# Patient Record
Sex: MALE | Employment: UNEMPLOYED | URBAN - METROPOLITAN AREA
[De-identification: names, ages, dates, MRNs, and addresses within clinical notes are randomized per-mention and may not be internally consistent; named-entity substitution may affect disease eponyms.]

---

## 2017-03-25 ENCOUNTER — APPOINTMENT (OUTPATIENT)
Dept: GENERAL RADIOLOGY | Age: 42
DRG: 603 | End: 2017-03-25
Attending: NURSE PRACTITIONER
Payer: COMMERCIAL

## 2017-03-25 ENCOUNTER — HOSPITAL ENCOUNTER (INPATIENT)
Age: 42
LOS: 3 days | Discharge: HOME | DRG: 603 | End: 2017-03-29
Attending: EMERGENCY MEDICINE | Admitting: HOSPITALIST
Payer: COMMERCIAL

## 2017-03-25 DIAGNOSIS — L03.119 CELLULITIS OF WRIST: ICD-10-CM

## 2017-03-25 DIAGNOSIS — L03.114 CELLULITIS OF LEFT ELBOW: Primary | ICD-10-CM

## 2017-03-25 DIAGNOSIS — R79.89 ELEVATED LACTIC ACID LEVEL: ICD-10-CM

## 2017-03-25 DIAGNOSIS — L03.114 CELLULITIS OF LEFT FOREARM: ICD-10-CM

## 2017-03-25 LAB
ANION GAP BLD CALC-SCNC: 8 MMOL/L (ref 3–18)
BASOPHILS # BLD AUTO: 0 K/UL (ref 0–0.06)
BASOPHILS # BLD: 0 % (ref 0–2)
BUN SERPL-MCNC: 11 MG/DL (ref 7–18)
BUN/CREAT SERPL: 13 (ref 12–20)
CALCIUM SERPL-MCNC: 8.8 MG/DL (ref 8.5–10.1)
CHLORIDE SERPL-SCNC: 104 MMOL/L (ref 100–108)
CO2 SERPL-SCNC: 27 MMOL/L (ref 21–32)
CREAT SERPL-MCNC: 0.82 MG/DL (ref 0.6–1.3)
DIFFERENTIAL METHOD BLD: ABNORMAL
EOSINOPHIL # BLD: 0.2 K/UL (ref 0–0.4)
EOSINOPHIL NFR BLD: 2 % (ref 0–5)
ERYTHROCYTE [DISTWIDTH] IN BLOOD BY AUTOMATED COUNT: 14.8 % (ref 11.6–14.5)
GLUCOSE SERPL-MCNC: 112 MG/DL (ref 74–99)
HCT VFR BLD AUTO: 43 % (ref 36–48)
HGB BLD-MCNC: 13.6 G/DL (ref 13–16)
LACTATE BLD-SCNC: 2.4 MMOL/L (ref 0.4–2)
LYMPHOCYTES # BLD AUTO: 12 % (ref 21–52)
LYMPHOCYTES # BLD: 1.3 K/UL (ref 0.9–3.6)
MCH RBC QN AUTO: 28.4 PG (ref 24–34)
MCHC RBC AUTO-ENTMCNC: 31.6 G/DL (ref 31–37)
MCV RBC AUTO: 89.8 FL (ref 74–97)
MONOCYTES # BLD: 1 K/UL (ref 0.05–1.2)
MONOCYTES NFR BLD AUTO: 9 % (ref 3–10)
NEUTS SEG # BLD: 9 K/UL (ref 1.8–8)
NEUTS SEG NFR BLD AUTO: 77 % (ref 40–73)
PLATELET # BLD AUTO: 249 K/UL (ref 135–420)
PMV BLD AUTO: 11.4 FL (ref 9.2–11.8)
POTASSIUM SERPL-SCNC: 3.8 MMOL/L (ref 3.5–5.5)
RBC # BLD AUTO: 4.79 M/UL (ref 4.7–5.5)
SODIUM SERPL-SCNC: 139 MMOL/L (ref 136–145)
WBC # BLD AUTO: 11.5 K/UL (ref 4.6–13.2)

## 2017-03-25 PROCEDURE — 83605 ASSAY OF LACTIC ACID: CPT

## 2017-03-25 PROCEDURE — 73080 X-RAY EXAM OF ELBOW: CPT

## 2017-03-25 PROCEDURE — 96375 TX/PRO/DX INJ NEW DRUG ADDON: CPT

## 2017-03-25 PROCEDURE — 99284 EMERGENCY DEPT VISIT MOD MDM: CPT

## 2017-03-25 PROCEDURE — 74011250636 HC RX REV CODE- 250/636: Performed by: NURSE PRACTITIONER

## 2017-03-25 PROCEDURE — 74011000258 HC RX REV CODE- 258: Performed by: NURSE PRACTITIONER

## 2017-03-25 PROCEDURE — 96365 THER/PROPH/DIAG IV INF INIT: CPT

## 2017-03-25 PROCEDURE — 80048 BASIC METABOLIC PNL TOTAL CA: CPT | Performed by: NURSE PRACTITIONER

## 2017-03-25 PROCEDURE — 73090 X-RAY EXAM OF FOREARM: CPT

## 2017-03-25 PROCEDURE — 87040 BLOOD CULTURE FOR BACTERIA: CPT | Performed by: NURSE PRACTITIONER

## 2017-03-25 PROCEDURE — 85025 COMPLETE CBC W/AUTO DIFF WBC: CPT | Performed by: NURSE PRACTITIONER

## 2017-03-25 PROCEDURE — 73130 X-RAY EXAM OF HAND: CPT

## 2017-03-25 PROCEDURE — 96366 THER/PROPH/DIAG IV INF ADDON: CPT

## 2017-03-25 RX ORDER — HYDRALAZINE HYDROCHLORIDE 20 MG/ML
10 INJECTION INTRAMUSCULAR; INTRAVENOUS ONCE
Status: COMPLETED | OUTPATIENT
Start: 2017-03-25 | End: 2017-03-25

## 2017-03-25 RX ORDER — MORPHINE SULFATE 2 MG/ML
6 INJECTION, SOLUTION INTRAMUSCULAR; INTRAVENOUS
Status: COMPLETED | OUTPATIENT
Start: 2017-03-25 | End: 2017-03-25

## 2017-03-25 RX ADMIN — MORPHINE SULFATE 6 MG: 2 INJECTION, SOLUTION INTRAMUSCULAR; INTRAVENOUS at 22:58

## 2017-03-25 RX ADMIN — HYDRALAZINE HYDROCHLORIDE 10 MG: 20 INJECTION INTRAMUSCULAR; INTRAVENOUS at 22:57

## 2017-03-25 RX ADMIN — PIPERACILLIN SODIUM,TAZOBACTAM SODIUM 3.38 G: 3; .375 INJECTION, POWDER, FOR SOLUTION INTRAVENOUS at 23:08

## 2017-03-25 RX ADMIN — SODIUM CHLORIDE 1000 ML: 900 INJECTION, SOLUTION INTRAVENOUS at 23:08

## 2017-03-25 NOTE — IP AVS SNAPSHOT
Current Discharge Medication List  
  
START taking these medications Dose & Instructions Dispensing Information Comments Morning Noon Evening Bedtime  
 hydrOXYzine pamoate 25 mg capsule Commonly known as:  VISTARIL Your last dose was: Your next dose is:    
   
   
 Dose:  25 mg Take 1 Cap by mouth three (3) times daily as needed for Itching for up to 14 days. Quantity:  20 Cap Refills:  0  
     
   
   
   
  
 trimethoprim-sulfamethoxazole 160-800 mg per tablet Commonly known as:  BACTRIM DS Your last dose was: Your next dose is:    
   
   
 Dose:  1 Tab Take 1 Tab by mouth two (2) times a day. Indications: Skin and Skin Structure Infection Quantity:  18 Tab Refills:  0 CONTINUE these medications which have CHANGED Dose & Instructions Dispensing Information Comments Morning Noon Evening Bedtime  
 triamcinolone acetonide 0.1 % ointment Commonly known as:  KENALOG What changed:  Another medication with the same name was removed. Continue taking this medication, and follow the directions you see here. Your last dose was: Your next dose is:    
   
   
 Apply  to affected area two (2) times a day. use thin layer Refills:  0 CONTINUE these medications which have NOT CHANGED Dose & Instructions Dispensing Information Comments Morning Noon Evening Bedtime  
 atorvastatin 40 mg tablet Commonly known as:  LIPITOR Your last dose was: Your next dose is:    
   
   
 Dose:  40 mg Take 40 mg by mouth daily. Refills:  0  
     
   
   
   
  
 famotidine 20 mg tablet Commonly known as:  PEPCID Your last dose was: Your next dose is:    
   
   
 Dose:  20 mg Take 1 Tab by mouth two (2) times a day. Quantity:  30 Tab Refills:  0  
     
   
   
   
  
 hydroCHLOROthiazide 12.5 mg tablet Commonly known as:  HYDRODIURIL  
   
 Your last dose was: Your next dose is:    
   
   
 Dose:  12.5 mg Take 12.5 mg by mouth daily. Refills:  0  
     
   
   
   
  
 oxyCODONE-acetaminophen 5-325 mg per tablet Commonly known as:  PERCOCET Your last dose was: Your next dose is:    
   
   
 Dose:  1 Tab Take 1 tablet by mouth every eight (8) hours as needed. Quantity:  10 tablet Refills:  0  
     
   
   
   
  
 senna-docusate 8.6-50 mg per tablet Commonly known as:  Jeral Bevel Your last dose was: Your next dose is:    
   
   
 Dose:  2 Tab Take 2 tablets by mouth nightly. Quantity:  30 tablet Refills:  1 Where to Get Your Medications Information on where to get these meds will be given to you by the nurse or doctor. ! Ask your nurse or doctor about these medications  
  hydrOXYzine pamoate 25 mg capsule  
 trimethoprim-sulfamethoxazole 160-800 mg per tablet

## 2017-03-25 NOTE — IP AVS SNAPSHOT
[facility] 
 
 
 [address] [place] [address] 
[phone] Patient: Lani Lara MRN: [id number] FBP:2/93/5030 You are allergic to the following Allergen Reactions Chocolate (Cocoa) Hives Citrus And Derivatives Hives Dairy Aid (Lactase) Hives Egg Hives [address] Iodine And Iodide Containing Products Hives Mercury (Elemental) Unknown (comments) Nuts (Tree Nut) Hives Shellfish Containing Products Hives Tomato Hives Recent Documentation Height Weight BMI Smoking Status 1.88 m 86.2 kg 24.39 kg/m2 Never Smoker Unresulted Labs Order Current Status CULTURE, BLOOD Preliminary result CULTURE, BLOOD Preliminary result Emergency Contacts Name Discharge Info Relation Home Work Mobile Ulysses Alatorre  Friend [5] [phone] About your hospitalization You were admitted on:  March 26, 2017 You last received care in the:  [address] [facility] You were discharged on:  March 29, 2017 Unit phone number:  [phone] Why you were hospitalized Your primary diagnosis was:  Not on File Your diagnoses also included:  Cellulitis Of Left Forearm Providers Seen During Your Hospitalizations Provider Role Specialty Primary office phone Santhosh Washburn DO Attending Provider Emergency Medicine [phone] Demetrice Grey DO Attending Provider Internal Medicine [phone] Laurel Kemp MD [facility] [phone] Taylor Camara MD Attending Provider Internal Medicine [phone] Your Primary Care Physician (PCP) Primary Care Physician Office Phone Office Fax Arleen Pillai [phone] Follow-up Information Follow up With Details Comments Contact Info Irlanda Owens MD   [address] [facility] [phone] [facility] [id number] [phone] Follow up with Dermatologist in 1-2 weeks Current Discharge Medication List  
  
START taking these medications Dose & Instructions Dispensing Information Comments Morning Noon Evening Bedtime  
 hydrOXYzine pamoate 25 mg capsule Commonly known as:  VISTARIL Your last dose was: Your next dose is:    
   
   
 Dose:  25 mg Take 1 Cap by mouth three (3) times daily as needed for Itching for up to 14 days. Quantity:  20 Cap Refills:  0  
     
   
   
   
  
 trimethoprim-sulfamethoxazole 160-800 mg per tablet Commonly known as:  BACTRIM DS Your last dose was: Your next dose is:    
   
   
 Dose:  1 Tab Take 1 Tab by mouth two (2) times a day. Indications: Skin and Skin Structure Infection Quantity:  18 Tab Refills:  0 CONTINUE these medications which have CHANGED Dose & Instructions Dispensing Information Comments Morning Noon Evening Bedtime  
 triamcinolone acetonide 0.1 % ointment Commonly known as:  KENALOG What changed:  Another medication with the same name was removed. Continue taking this medication, and follow the directions you see here. Your last dose was: Your next dose is:    
   
   
 Apply  to affected area two (2) times a day. use thin layer Refills:  0 CONTINUE these medications which have NOT CHANGED Dose & Instructions Dispensing Information Comments Morning Noon Evening Bedtime  
 atorvastatin 40 mg tablet Commonly known as:  LIPITOR Your last dose was: Your next dose is:    
   
   
 Dose:  40 mg Take 40 mg by mouth daily. Refills:  0  
     
   
   
   
  
 famotidine 20 mg tablet Commonly known as:  PEPCID Your last dose was: Your next dose is:    
   
   
 Dose:  20 mg Take 1 Tab by mouth two (2) times a day. Quantity:  30 Tab Refills:  0  
     
   
   
   
  
 hydroCHLOROthiazide 12.5 mg tablet Commonly known as:  HYDRODIURIL Your last dose was: Your next dose is:    
   
   
 Dose:  12.5 mg Take 12.5 mg by mouth daily. Refills:  0  
     
   
   
   
  
 oxyCODONE-acetaminophen 5-325 mg per tablet Commonly known as:  PERCOCET Your last dose was: Your next dose is:    
   
   
 Dose:  1 Tab Take 1 tablet by mouth every eight (8) hours as needed. Quantity:  10 tablet Refills:  0  
     
   
   
   
  
 senna-docusate 8.6-50 mg per tablet Commonly known as:  Megha Riches Your last dose was: Your next dose is:    
   
   
 Dose:  2 Tab Take 2 tablets by mouth nightly. Quantity:  30 tablet Refills:  1 Where to Get Your Medications Information on where to get these meds will be given to you by the nurse or doctor. ! Ask your nurse or doctor about these medications  
  hydrOXYzine pamoate 25 mg capsule  
 trimethoprim-sulfamethoxazole 160-800 mg per tablet Discharge Instructions DISCHARGE SUMMARY from Nurse The following personal items are in your possession at time of discharge: 
 
Dental Appliances: None Visual Aid: None, Glasses Home Medications: None Jewelry: None Clothing: Belt, Footwear, Jacket/Coat, Pants, Shirt, Socks, Undergarments Other Valuables: Cell Phone, Orvis Spindle PATIENT INSTRUCTIONS: 
 
 
F-face looks uneven A-arms unable to move or move unevenly S-speech slurred or non-existent T-time-call 911 as soon as signs and symptoms begin-DO NOT go Back to bed or wait to see if you get better-TIME IS BRAIN. Warning Signs of HEART ATTACK Call 911 if you have these symptoms: 
? Chest discomfort. Most heart attacks involve discomfort in the center of the chest that lasts more than a few minutes, or that goes away and comes back. It can feel like uncomfortable pressure, squeezing, fullness, or pain. ? Discomfort in other areas of the upper body. Symptoms can include pain or discomfort in one or both arms, the back, neck, jaw, or stomach. ? Shortness of breath with or without chest discomfort. ? Other signs may include breaking out in a cold sweat, nausea, or lightheadedness. Don't wait more than five minutes to call [address]! Fast action can save your life. Calling 911 is almost always the fastest way to get lifesaving treatment. Emergency Medical Services staff can begin treatment when they arrive  up to an hour sooner than if someone gets to the hospital by car. The discharge information has been reviewed with the patient. The patient verbalized understanding. Discharge medications reviewed with the patient and appropriate educational materials and side effects teaching were provided. Cellulitis: Care Instructions Your Care Instructions Cellulitis is a skin infection. It often occurs after a break in the skin from a scrape, cut, bite, or puncture, or after a rash. The doctor has checked you carefully, but problems can develop later. If you notice any problems or new symptoms, get medical treatment right away. Follow-up care is a key part of your treatment and safety. Be sure to make and go to all appointments, and call your doctor if you are having problems. It's also a good idea to know your test results and keep a list of the medicines you take. How can you care for yourself at home? · Take your antibiotics as directed. Do not stop taking them just because you feel better. You need to take the full course of antibiotics. · Prop up the infected area on pillows to reduce pain and swelling. Try to keep the area above the level of your heart as often as you can. · If your doctor told you how to care for your wound, follow your doctor's instructions. If you did not get instructions, follow this general advice: ¨ Wash the wound with clean water 2 times a day. Don't use hydrogen peroxide or alcohol, which can slow healing. ¨ You may cover the wound with a thin layer of petroleum jelly, such as Vaseline, and a nonstick bandage. ¨ Apply more petroleum jelly and replace the bandage as needed. · Be safe with medicines. Take pain medicines exactly as directed. ¨ If the doctor gave you a prescription medicine for pain, take it as prescribed. ¨ If you are not taking a prescription pain medicine, ask your doctor if you can take an over-the-counter medicine. To prevent cellulitis in the future · Try to prevent cuts, scrapes, or other injuries to your skin. Cellulitis most often occurs where there is a break in the skin. · If you get a scrape, cut, mild burn, or bite, wash the wound with clean water as soon as you can to help avoid infection. Don't use hydrogen peroxide or alcohol, which can slow healing. · If you have swelling in your legs (edema), support stockings and good skin care may help prevent leg sores and cellulitis. · Take care of your feet, especially if you have diabetes or other conditions that increase the risk of infection. Wear shoes and socks. Do not go barefoot. If you have athlete's foot or other skin problems on your feet, talk to your doctor about how to treat them. When should you call for help? Call your doctor now or seek immediate medical care if: 
· You have signs that your infection is getting worse, such as: 
¨ Increased pain, swelling, warmth, or redness. ¨ Red streaks leading from the area. ¨ Pus draining from the area. ¨ A fever. · You get a rash. Watch closely for changes in your health, and be sure to contact your doctor if: 
· You are not getting better after 1 day (24 hours). · You do not get better as expected. Where can you learn more? Go to http://myrna-omari.info/. Richard Wright in the search box to learn more about \"Cellulitis: Care Instructions. \" Current as of: October 13, 2016 Content Version: 11.2 © 0462-1689 [employer]. Care instructions adapted under license by [employer] (which disclaims liability or warranty for this information). If you have questions about a medical condition or this instruction, always ask your healthcare professional. [address] any warranty or liability for your use of this information. High Blood Pressure: Care Instructions Your Care Instructions If your blood pressure is usually above 140/90, you have high blood pressure, or hypertension. That means the top number is 140 or higher or the bottom number is 90 or higher, or both. Despite what a lot of people think, high blood pressure usually doesn't cause headaches or make you feel dizzy or lightheaded. It usually has no symptoms. But it does increase your risk for heart attack, stroke, and kidney or eye damage. The higher your blood pressure, the more your risk increases. Your doctor will give you a goal for your blood pressure. Your goal will be based on your health and your age. An example of a goal is to keep your blood pressure below 140/90. Lifestyle changes, such as eating healthy and being active, are always important to help lower blood pressure. You might also take medicine to reach your blood pressure goal. 
Follow-up care is a key part of your treatment and safety.  Be sure to make and go to all appointments, and call your doctor if you are having problems. It's also a good idea to know your test results and keep a list of the medicines you take. How can you care for yourself at home? Medical treatment · If you stop taking your medicine, your blood pressure will go back up. You may take one or more types of medicine to lower your blood pressure. Be safe with medicines. Take your medicine exactly as prescribed. Call your doctor if you think you are having a problem with your medicine. · Talk to your doctor before you start taking aspirin every day. Aspirin can help certain people lower their risk of a heart attack or stroke. But taking aspirin isn't right for everyone, because it can cause serious bleeding. · See your doctor regularly. You may need to see the doctor more often at first or until your blood pressure comes down. · If you are taking blood pressure medicine, talk to your doctor before you take decongestants or anti-inflammatory medicine, such as ibuprofen. Some of these medicines can raise blood pressure. · Learn how to check your blood pressure at home. Lifestyle changes · Stay at a healthy weight. This is especially important if you put on weight around the waist. Losing even 10 pounds can help you lower your blood pressure. · If your doctor recommends it, get more exercise. Walking is a good choice. Bit by bit, increase the amount you walk every day. Try for at least 30 minutes on most days of the week. You also may want to swim, bike, or do other activities. · Avoid or limit alcohol. Talk to your doctor about whether you can drink any alcohol. · Try to limit how much sodium you eat to less than 2,300 milligrams (mg) a day. Your doctor may ask you to try to eat less than 1,500 mg a day. · Eat plenty of fruits (such as bananas and oranges), vegetables, legumes, whole grains, and low-fat dairy products. · Lower the amount of saturated fat in your diet.  Saturated fat is found in animal products such as milk, cheese, and meat. Limiting these foods may help you lose weight and also lower your risk for heart disease. · Do not smoke. Smoking increases your risk for heart attack and stroke. If you need help quitting, talk to your doctor about stop-smoking programs and medicines. These can increase your chances of quitting for good. When should you call for help? Call 911 anytime you think you may need emergency care. This may mean having symptoms that suggest that your blood pressure is causing a serious heart or blood vessel problem. Your blood pressure may be over 180/110. For example, call 911 if: 
· You have symptoms of a heart attack. These may include: ¨ Chest pain or pressure, or a strange feeling in the chest. 
¨ Sweating. ¨ Shortness of breath. ¨ Nausea or vomiting. ¨ Pain, pressure, or a strange feeling in the back, neck, jaw, or upper belly or in one or both shoulders or arms. ¨ Lightheadedness or sudden weakness. ¨ A fast or irregular heartbeat. · You have symptoms of a stroke. These may include: 
¨ Sudden numbness, tingling, weakness, or loss of movement in your face, arm, or leg, especially on only one side of your body. ¨ Sudden vision changes. ¨ Sudden trouble speaking. ¨ Sudden confusion or trouble understanding simple statements. ¨ Sudden problems with walking or balance. ¨ A sudden, severe headache that is different from past headaches. · You have severe back or belly pain. Do not wait until your blood pressure comes down on its own. Get help right away. Call your doctor now or seek immediate care if: 
· Your blood pressure is much higher than normal (such as 180/110 or higher), but you don't have symptoms. · You think high blood pressure is causing symptoms, such as: ¨ Severe headache. ¨ Blurry vision. Watch closely for changes in your health, and be sure to contact your doctor if: · Your blood pressure measures 140/90 or higher at least 2 times. That means the top number is 140 or higher or the bottom number is 90 or higher, or both. · You think you may be having side effects from your blood pressure medicine. · Your blood pressure is usually normal, but it goes above normal at least 2 times. Where can you learn more? Go to http://myrna-omari.info/. Enter X834 in the search box to learn more about \"High Blood Pressure: Care Instructions. \" Current as of: August 8, 2016 Content Version: 11.2 © 1021-6383 [employer]. Care instructions adapted under license by [employer] (which disclaims liability or warranty for this information). If you have questions about a medical condition or this instruction, always ask your healthcare professional. [address] any warranty or liability for your use of this information. Low Sodium Diet (2,000 Milligram): Care Instructions Your Care Instructions Too much sodium causes your body to hold on to extra water. This can raise your blood pressure and force your heart and kidneys to work harder. In very serious cases, this could cause you to be put in the hospital. It might even be life-threatening. By limiting sodium, you will feel better and lower your risk of serious problems. The most common source of sodium is salt. People get most of the salt in their diet from canned, prepared, and packaged foods. Fast food and restaurant meals also are very high in sodium. Your doctor will probably limit your sodium to less than 2,000 milligrams (mg) a day. This limit counts all the sodium in prepared and packaged foods and any salt you add to your food. Follow-up care is a key part of your treatment and safety. Be sure to make and go to all appointments, and call your doctor if you are having problems.  It's also a good idea to know your test results and keep a list of the medicines you take. How can you care for yourself at home? Read food labels · Read labels on cans and food packages. The labels tell you how much sodium is in each serving. Make sure that you look at the serving size. If you eat more than the serving size, you have eaten more sodium. · Food labels also tell you the Percent Daily Value for sodium. Choose products with low Percent Daily Values for sodium. · Be aware that sodium can come in forms other than salt, including monosodium glutamate (MSG), sodium citrate, and sodium bicarbonate (baking soda). MSG is often added to Asian food. When you eat out, you can sometimes ask for food without MSG or added salt. Buy low-sodium foods · Buy foods that are labeled \"unsalted\" (no salt added), \"sodium-free\" (less than 5 mg of sodium per serving), or \"low-sodium\" (less than 140 mg of sodium per serving). Foods labeled \"reduced-sodium\" and \"light sodium\" may still have too much sodium. Be sure to read the label to see how much sodium you are getting. · Buy fresh vegetables, or frozen vegetables without added sauces. Buy low-sodium versions of canned vegetables, soups, and other canned goods. Prepare low-sodium meals · Cut back on the amount of salt you use in cooking. This will help you adjust to the taste. Do not add salt after cooking. One teaspoon of salt has about 2,300 mg of sodium. · Take the salt shaker off the table. · Flavor your food with garlic, lemon juice, onion, vinegar, herbs, and spices. Do not use soy sauce, lite soy sauce, steak sauce, onion salt, garlic salt, celery salt, mustard, or ketchup on your food. · Use low-sodium salad dressings, sauces, and ketchup. Or make your own salad dressings and sauces without adding salt. · Use less salt (or none) when recipes call for it. You can often use half the salt a recipe calls for without losing flavor. Other foods such as rice, pasta, and grains do not need added salt. · Rinse canned vegetables, and cook them in fresh water. This removes somebut not allof the salt. · Avoid water that is naturally high in sodium or that has been treated with water softeners, which add sodium. Call your local water company to find out the sodium content of your water supply. If you buy bottled water, read the label and choose a sodium-free brand. Avoid high-sodium foods · Avoid eating: ¨ Smoked, cured, salted, and canned meat, fish, and poultry. ¨ Ham, gomez, hot dogs, and luncheon meats. ¨ Regular, hard, and processed cheese and regular peanut butter. ¨ Crackers with salted tops, and other salted snack foods such as pretzels, chips, and salted popcorn. ¨ Frozen prepared meals, unless labeled low-sodium. ¨ Canned and dried soups, broths, and bouillon, unless labeled sodium-free or low-sodium. ¨ Canned vegetables, unless labeled sodium-free or low-sodium. ¨ Western Sandra fries, pizza, tacos, and other fast foods. ¨ Pickles, olives, ketchup, and other condiments, especially soy sauce, unless labeled sodium-free or low-sodium. Where can you learn more? Go to http://myrna-omari.info/. Enter T450 in the search box to learn more about \"Low Sodium Diet (2,000 Milligram): Care Instructions. \" Current as of: July 26, 2016 Content Version: 11.2 © 6991-6745 [employer]. Care instructions adapted under license by [employer] (which disclaims liability or warranty for this information). If you have questions about a medical condition or this instruction, always ask your healthcare professional. [place] [address] any warranty or liability for your use of this information. Learning About High Blood Pressure What is high blood pressure? Blood pressure is a measure of how hard the blood pushes against the walls of your arteries.  It's normal for blood pressure to go up and down throughout the day, but if it stays up, you have high blood pressure. Another name for high blood pressure is hypertension. Two numbers tell you your blood pressure. The first number is the systolic pressure. It shows how hard the blood pushes when your heart is pumping. The second number is the diastolic pressure. It shows how hard the blood pushes between heartbeats, when your heart is relaxed and filling with blood. A blood pressure of less than 120/80 (say \"120 over 80\") is ideal for an adult. High blood pressure is 140/90 or higher. You have high blood pressure if your top number is 140 or higher or your bottom number is 90 or higher, or both. Many people fall into the category in between, called prehypertension. People with prehypertension need to make lifestyle changes to bring their blood pressure down and help prevent or delay high blood pressure. What happens when you have high blood pressure? · Blood flows through your arteries with too much force. Over time, this damages the walls of your arteries. But you can't feel it. High blood pressure usually doesn't cause symptoms. · Fat and calcium start to build up in your arteries. This buildup is called plaque. Plaque makes your arteries narrower and stiffer. Blood can't flow through them as easily. · This lack of good blood flow starts to damage some of the organs in your body. This can lead to problems such as coronary artery disease and heart attack, heart failure, stroke, kidney failure, and eye damage. How can you prevent high blood pressure? · Stay at a healthy weight. · Try to limit how much sodium you eat to less than 2,300 milligrams (mg) a day. If you limit your sodium to 1,500 mg a day, you can lower your blood pressure even more. ¨ Buy foods that are labeled \"unsalted,\" \"sodium-free,\" or \"low-sodium. \" Foods labeled \"reduced-sodium\" and \"light sodium\" may still have too much sodium. ¨ Flavor your food with garlic, lemon juice, onion, vinegar, herbs, and spices instead of salt. Do not use soy sauce, steak sauce, onion salt, garlic salt, mustard, or ketchup on your food. ¨ Use less salt (or none) when recipes call for it. You can often use half the salt a recipe calls for without losing flavor. · Be physically active. Get at least 30 minutes of exercise on most days of the week. Walking is a good choice. You also may want to do other activities, such as running, swimming, cycling, or playing tennis or team sports. · Limit alcohol to 2 drinks a day for men and 1 drink a day for women. · Eat plenty of fruits, vegetables, and low-fat dairy products. Eat less saturated and total fats. How is high blood pressure treated? · Your doctor will suggest making lifestyle changes. For example, your doctor may ask you to eat healthy foods, quit smoking, lose extra weight, and be more active. · If lifestyle changes don't help enough or your blood pressure is very high, you will have to take medicine every day. Follow-up care is a key part of your treatment and safety. Be sure to make and go to all appointments, and call your doctor if you are having problems. It's also a good idea to know your test results and keep a list of the medicines you take. Where can you learn more? Go to http://myrna-omari.info/. Enter P501 in the search box to learn more about \"Learning About High Blood Pressure. \" Current as of: March 23, 2016 Content Version: 11.2 © 0015-4832 [employer]. Care instructions adapted under license by [employer] (which disclaims liability or warranty for this information). If you have questions about a medical condition or this instruction, always ask your healthcare professional. [place] [address] any warranty or liability for your use of this information. [employer] Thank you for requesting access to [employer]. Please follow the instructions below to securely access and download your online medical record. [employer] allows you to send messages to your doctor, view your test results, renew your prescriptions, schedule appointments, and more. How Do I Sign Up? 1. In your internet browser, go to [email or web address] 
2. Click on the First Time User? Click Here link in the Sign In box. You will be redirect to the New Member Sign Up page. 3. Enter your [employer] Access Code exactly as it appears below. You will not need to use this code after youve completed the sign-up process. If you do not sign up before the expiration date, you must request a new code. [employer] Access Code: [email or web address] Expires: 2017  9:04 PM (This is the date your [employer] access code will ) 4. Enter the last four digits of your Social Security Number (xxxx) and Date of Birth (mm/dd/yyyy) as indicated and click Submit. You will be taken to the next sign-up page. 5. Create a [employer] ID. This will be your [employer] login ID and cannot be changed, so think of one that is secure and easy to remember. 6. Create a [employer] password. You can change your password at any time. 7. Enter your Password Reset Question and Answer. This can be used at a later time if you forget your password. 8. Enter your e-mail address. You will receive e-mail notification when new information is available in [address] 9. Click Sign Up. You can now view and download portions of your medical record. 10. Click the Download Summary menu link to download a portable copy of your medical information. Additional Information If you have questions, please visit the Frequently Asked Questions section of the [employer] website at [email or web address] Remember, [employer] is NOT to be used for urgent needs. For medical emergencies, dial 911. Patient armband removed and shredded. Discharge Orders None Introducing [facility] SERVICES! Jose Jj introduces JUNIQE patient portal. Now you can access parts of your medical record, email your doctor's office, and request medication refills online. 1. In your internet browser, go to [email or web address] 2. Click on the First Time User? Click Here link in the Sign In box. You will see the New Member Sign Up page. 3. Enter your J[employer] Access Code exactly as it appears below. You will not need to use this code after youve completed the sign-up process. If you do not sign up before the expiration date, you must request a new code. · JUNIQE Access Code: [email or web address] Expires: 6/23/2017  9:04 PM 
 
4. Enter the last four digits of your Social Security Number (xxxx) and Date of Birth (mm/dd/yyyy) as indicated and click Submit. You will be taken to the next sign-up page. 5. Create a JUNIQE ID. This will be your JUN[employer]E login ID and cannot be changed, so think of one that is secure and easy to remember. 6. Create a JUNIQE password. You can change your password at any time. 7. Enter your Password Reset Question and Answer. This can be used at a later time if you forget your password. 8. Enter your e-mail address. You will receive e-mail notification when new information is available in [address] 9. Click Sign Up. You can now view and download portions of your medical record. 10. Click the Download Summary menu link to download a portable copy of your medical information. If you have questions, please visit the Frequently Asked Questions section of the JUNIQE website. Remember, JUNIQE is NOT to be used for urgent needs. For medical emergencies, dial 911. Now available from your iPhone and Android! General Information Please provide this summary of care documentation to your next provider.  
  
  
    
    
 Patient Signature: ____________________________________________________________ Date:  ____________________________________________________________  
  
Courtney Canes Provider Signature:  ____________________________________________________________ Date:  ____________________________________________________________

## 2017-03-26 PROBLEM — L03.114 CELLULITIS OF LEFT FOREARM: Status: ACTIVE | Noted: 2017-03-26

## 2017-03-26 LAB — LACTATE BLD-SCNC: 0.8 MMOL/L (ref 0.4–2)

## 2017-03-26 PROCEDURE — 83605 ASSAY OF LACTIC ACID: CPT

## 2017-03-26 PROCEDURE — 87186 SC STD MICRODIL/AGAR DIL: CPT | Performed by: HOSPITALIST

## 2017-03-26 PROCEDURE — 74011250637 HC RX REV CODE- 250/637: Performed by: HOSPITALIST

## 2017-03-26 PROCEDURE — 77030020263 HC SOL INJ SOD CL0.9% LFCR 1000ML

## 2017-03-26 PROCEDURE — 87070 CULTURE OTHR SPECIMN AEROBIC: CPT | Performed by: HOSPITALIST

## 2017-03-26 PROCEDURE — 65270000029 HC RM PRIVATE

## 2017-03-26 PROCEDURE — 87077 CULTURE AEROBIC IDENTIFY: CPT | Performed by: HOSPITALIST

## 2017-03-26 PROCEDURE — 74011000258 HC RX REV CODE- 258: Performed by: HOSPITALIST

## 2017-03-26 PROCEDURE — 74011250636 HC RX REV CODE- 250/636: Performed by: HOSPITALIST

## 2017-03-26 PROCEDURE — 93971 EXTREMITY STUDY: CPT

## 2017-03-26 RX ORDER — SODIUM CHLORIDE 0.9 % (FLUSH) 0.9 %
5-10 SYRINGE (ML) INJECTION EVERY 8 HOURS
Status: DISCONTINUED | OUTPATIENT
Start: 2017-03-26 | End: 2017-03-29 | Stop reason: HOSPADM

## 2017-03-26 RX ORDER — SODIUM CHLORIDE 0.9 % (FLUSH) 0.9 %
5-10 SYRINGE (ML) INJECTION AS NEEDED
Status: DISCONTINUED | OUTPATIENT
Start: 2017-03-26 | End: 2017-03-29 | Stop reason: HOSPADM

## 2017-03-26 RX ORDER — TRIAMCINOLONE ACETONIDE 1 MG/G
OINTMENT TOPICAL 2 TIMES DAILY
Status: DISCONTINUED | OUTPATIENT
Start: 2017-03-26 | End: 2017-03-29 | Stop reason: HOSPADM

## 2017-03-26 RX ORDER — SODIUM CHLORIDE 9 MG/ML
75 INJECTION, SOLUTION INTRAVENOUS CONTINUOUS
Status: DISCONTINUED | OUTPATIENT
Start: 2017-03-26 | End: 2017-03-29 | Stop reason: HOSPADM

## 2017-03-26 RX ORDER — HYDROCHLOROTHIAZIDE 25 MG/1
12.5 TABLET ORAL DAILY
Status: DISCONTINUED | OUTPATIENT
Start: 2017-03-27 | End: 2017-03-29 | Stop reason: HOSPADM

## 2017-03-26 RX ORDER — ENOXAPARIN SODIUM 100 MG/ML
40 INJECTION SUBCUTANEOUS EVERY 24 HOURS
Status: DISCONTINUED | OUTPATIENT
Start: 2017-03-26 | End: 2017-03-29 | Stop reason: HOSPADM

## 2017-03-26 RX ORDER — OXYCODONE AND ACETAMINOPHEN 5; 325 MG/1; MG/1
1 TABLET ORAL
Status: DISCONTINUED | OUTPATIENT
Start: 2017-03-26 | End: 2017-03-29 | Stop reason: HOSPADM

## 2017-03-26 RX ADMIN — ENOXAPARIN SODIUM 40 MG: 40 INJECTION SUBCUTANEOUS at 06:09

## 2017-03-26 RX ADMIN — SODIUM CHLORIDE 2000 MG: 900 INJECTION, SOLUTION INTRAVENOUS at 14:30

## 2017-03-26 RX ADMIN — Medication 10 ML: at 06:00

## 2017-03-26 RX ADMIN — SODIUM CHLORIDE 75 ML/HR: 900 INJECTION, SOLUTION INTRAVENOUS at 06:10

## 2017-03-26 RX ADMIN — Medication 10 ML: at 14:00

## 2017-03-26 RX ADMIN — PIPERACILLIN SODIUM,TAZOBACTAM SODIUM 3.38 G: 3; .375 INJECTION, POWDER, FOR SOLUTION INTRAVENOUS at 11:39

## 2017-03-26 RX ADMIN — PIPERACILLIN SODIUM,TAZOBACTAM SODIUM 3.38 G: 3; .375 INJECTION, POWDER, FOR SOLUTION INTRAVENOUS at 22:46

## 2017-03-26 RX ADMIN — Medication 10 ML: at 22:33

## 2017-03-26 RX ADMIN — PIPERACILLIN SODIUM,TAZOBACTAM SODIUM 3.38 G: 3; .375 INJECTION, POWDER, FOR SOLUTION INTRAVENOUS at 18:16

## 2017-03-26 RX ADMIN — TRIAMCINOLONE ACETONIDE: 1 OINTMENT TOPICAL at 18:16

## 2017-03-26 NOTE — ED TRIAGE NOTES
Left are is warm, swollen and painful. Hand distal to the area is cold. As per the patient he is prone to skin infections due to him scratching at his eczema.

## 2017-03-26 NOTE — ACP (ADVANCE CARE PLANNING)
Patient has designated ______friend__________________ to participate in his/her discharge plan and to receive any needed information.      Name: arvind  Address:  Phone [phone]

## 2017-03-26 NOTE — ED NOTES
Purposeful rounding completed:    Side rails up x 1:  YES  Bed in low position and wheels locked: YES  Call bell within reach: YES  Comfort addressed: YES    Toileting needs addressed: YES  Plan of care reviewed/updated with patient and or family members: YES  IV site assessed: YES  Pain assessed and addressed: YES, 0 pt asleep

## 2017-03-26 NOTE — PROCEDURES
[facility]vi  *** FINAL REPORT ***    Name: Raghav Mcdonald  MRN: [id number]    Inpatient  : 1975  HIS Order #: [id number]  [id number] [facility] Visit #: [id number]  Date: 26 Mar 2017    TYPE OF TEST: Peripheral Venous Testing    REASON FOR TEST  Limb swelling    Left Arm:-  Deep venous thrombosis:           No  Superficial venous thrombosis:    No      INTERPRETATION/FINDINGS  Duplex images were obtained using 2-D gray scale, color flow and  spectral doppler analysis. Left arm :  1. Deep vein(s) visualized include the internal jugular, subclavian,  axillary, brachial, radial and ulnar veins. 2. No evidence of deep venous thrombosis detected in the veins  visualized. 3. No evidence of deep vein thrombosis in the contralateral subclavian   vein. 4. Superficial vein(s) visualized include the basilic (upper arm) and  cephalic (upper arm) veins. 5. No evidence of superficial thrombosis detected. ADDITIONAL COMMENTS  Limited exam due patient in extreme pain. results given to Tressa Pedraza I have personally reviewed the data relevant to the interpretation of  this  study. TECHNOLOGIST: Sonja Nielsen RDMS, RVT  Signed: 3/26/2017 12:15:08 AM    PHYSICIAN: BIENVENIDO Baez MD  Signed: 3/26/2017 11:36:14 AM

## 2017-03-26 NOTE — ED NOTES
Side rails up x 2: YES  Bed in low position and wheels locked: YES  Call bell within reach: YES  Comfort addressed: YES   Toileting needs addressed: YES  Plan of care reviewed/updated with patient and or family members: YES  IV site assessed: YES  Pain assessed and addressed: YES, 0

## 2017-03-26 NOTE — ED NOTES
Assume care of patient, patient resting at this time, patient with no CP, SOB or pain at this time.   Purposeful rounding completed:    Side rails up x 2:  YES  Bed in low position and wheels locked: YES  Call bell within reach: YES  Comfort addressed: YES    Toileting needs addressed: YES  Plan of care reviewed/updated with patient and or family members: YES  IV site assessed: YES  Pain assessed and addressed: YES, 0

## 2017-03-26 NOTE — ED NOTES
The Sepsis Screening has been completed on arrival in the Emergency Department. Vital signs:  Patient Vitals for the past 4 hrs:   Temp Pulse Resp BP SpO2   03/25/17 2057 99.7 °F (37.6 °C) 96 18 (!) 181/119 100 %            =monitored (data validate)  MAP (Calculated): 140    =calculated (manual entry)    Patient meets 2 or more SIRS criteria with suspected infection? NO    IF ANSWER IS YES, INITIATE NURSE DRIVEN SEPSIS ORDERS OR REQUEST SEPSIS BUNDLE ORDER BY PROVIDER.      SIRS Criteria is achieved when two or more of the following are present:    Temperature < 96.8°F (36°C) or > 100.9°F (38.3°C)   Heart Rate > 90 beats per minute   Respiratory Rate > 20 beats per minute

## 2017-03-26 NOTE — ED PROVIDER NOTES
HPI Comments: Baljit Zarate is a 39year old male who presents to the ED with a c/o left forearm pain, swelling and redness. States he just noted it \"again\" in the left forearm today. Admits that he scratches himself and occassionally gets cellulitis as a result. Patient is a 39 y.o. male presenting with skin infection. The history is provided by the patient. History limited by: No communication barrier. Skin Infection   This is a new problem. The current episode started 12 to 24 hours ago. The problem occurs constantly. The problem has not changed since onset. Nothing aggravates the symptoms. Nothing relieves the symptoms. He has tried nothing for the symptoms. The treatment provided no relief. Past Medical History:   Diagnosis Date    Bacteremia 12/14    Contact dermatitis and other eczema, due to unspecified cause     molluscum and eczema    CVA (cerebral infarction)     Gout     HLD (hyperlipidemia)     Stroke [facility])        Past Surgical History:   Procedure Laterality Date    HX ORTHOPAEDIC           Family History:   Problem Relation Age of Onset    Hypertension Other        Social History     Social History    Marital status: SINGLE     Spouse name: N/A    Number of children: N/A    Years of education: N/A     Occupational History    Not on file. Social History Main Topics    Smoking status: Never Smoker    Smokeless tobacco: Never Used    Alcohol use No    Drug use: No    Sexual activity: Not on file     Other Topics Concern    Not on file     Social History Narrative         ALLERGIES: Chocolate [cocoa]; Citrus and derivatives; Dairy aid [lactase]; Egg; Fish containing products; Iodine and iodide containing products; Nuts [tree nut]; Shellfish containing products; and Tomato    Review of Systems   Constitutional: Negative. HENT: Negative. Eyes: Negative. Respiratory: Negative. Cardiovascular: Negative. Gastrointestinal: Negative. Endocrine: Negative. Genitourinary: Negative. Musculoskeletal: Negative. Skin: Positive for color change and wound. Redness and swelling to the left wrist, forearm and elbow    Allergic/Immunologic: Negative. Neurological: Negative. Hematological: Negative. Psychiatric/Behavioral: Negative. Vitals:    03/25/17 2057 03/25/17 2224   BP: (!) 181/119    Pulse: 96    Resp: 18    Temp: 99.7 °F (37.6 °C)    SpO2: 100% 100%   Weight: 86.2 kg (190 lb)    Height: 6' 2\" (1.88 m)             Physical Exam   Constitutional: He is oriented to person, place, and time. He appears well-developed and well-nourished. No distress. HENT:   Head: Normocephalic and atraumatic. Eyes: Pupils are equal, round, and reactive to light. Neck: Normal range of motion. Neck supple. Cardiovascular: Normal rate and regular rhythm. Pulmonary/Chest: Effort normal and breath sounds normal.   Abdominal: Soft. Bowel sounds are normal. There is no tenderness. There is no rebound and no guarding. Genitourinary:   Genitourinary Comments: NE   Musculoskeletal: He exhibits edema. Pt has restricted ROM in flexion and extension of the left elbow. He is able to maneuver midrange, but has difficulty acheiving full extension and full flexion of the left elbow joint. Neurological: He is alert and oriented to person, place, and time. No cranial nerve deficit. Coordination normal.   Skin: Skin is warm and dry. There is profound bright erythema to the left elbow, forearm, and wrist.  There is edema as well, with what appears to be forming blisters. Psychiatric: He has a normal mood and affect. Nursing note and vitals reviewed. MDM  Number of Diagnoses or Management Options  Cellulitis of left elbow:   Cellulitis of left forearm:   Cellulitis of wrist:   Elevated blood pressure:   Elevated lactic acid level:   Diagnosis management comments: PROGRESS NOTE:    The left elbow, forearm and wrist plain film images were reviewed. There was a full sail sign noted on the anterior left elbow. No posterior sign. Aye Jackson NP   12:40 AM           Amount and/or Complexity of Data Reviewed  Clinical lab tests: ordered and reviewed  Tests in the radiology section of CPT®: ordered and reviewed    Risk of Complications, Morbidity, and/or Mortality  Presenting problems: moderate  Diagnostic procedures: moderate  Management options: moderate    Patient Progress  Patient progress: stable    ED Course       Procedures    PROGRESS NOTE:  One or more blood pressure readings were noted elevated during the Pt's presentation in the emergency department this date. This abnormal reading has been cited in the Pt's diagnosis, and they have been encouraged to follow up with their primary care physician, or referred to a consultant for further evaluation and treatment. Aye Jackson NP  12:26 AM    Diagnosis:   1. Cellulitis of left elbow    2. Cellulitis of left forearm    3. Cellulitis of wrist    4. Elevated lactic acid level    5. Elevated blood pressure          Disposition:   Admitted - Dr Jesusita Talley     None          Patient's Medications   Start Taking    No medications on file   Continue Taking    ATORVASTATIN (LIPITOR) 40 MG TABLET    Take 40 mg by mouth daily. FAMOTIDINE (PEPCID) 20 MG TABLET    Take 1 Tab by mouth two (2) times a day. HYDROCHLOROTHIAZIDE (HYDRODIURIL) 12.5 MG TABLET    Take 12.5 mg by mouth daily. OXYCODONE-ACETAMINOPHEN (PERCOCET) 5-325 MG PER TABLET    Take 1 tablet by mouth every eight (8) hours as needed. SENNA-DOCUSATE (PERICOLACE) 8.6-50 MG PER TABLET    Take 2 tablets by mouth nightly. TRIAMCINOLONE ACETONIDE (KENALOG) 0.025 % TOPICAL CREAM    Apply  to affected area two (2) times a day. use thin layer    TRIAMCINOLONE ACETONIDE (KENALOG) 0.1 % OINTMENT    Apply  to affected area two (2) times a day.  use thin layer   These Medications have changed    No medications on file   Stop Taking    No medications on file

## 2017-03-26 NOTE — ED NOTES
Bedside shift change report given to Caro Closs, RN (oncoming nurse) by Carla Carrasco RN (offgoing nurse). Report included the following information SBAR.

## 2017-03-26 NOTE — H&P
History and Physical    Patient: Maryann Baker               Sex: male          DOA: 3/25/2017       YOB: 1975      Age:  39 y.o.        LOS:  LOS: 0 days        HPI:     Maryann Baker is a 39 y.o. male who presents to ED complaining of left arm pain and redness. Patient has a history of severe eczema throughout his body. He states that he scratches often and often times end up with tears in the skin that lead to infections. He noticed the pain earlier in the morning. He denies any other symptoms. While in ED, he was found to have elevated lactic acid. He was placed on IV antibiotics. He is admitted for further management. Past Medical History:   Diagnosis Date    Bacteremia 12/14    Contact dermatitis and other eczema, due to unspecified cause     molluscum and eczema    CVA (cerebral infarction)     Gout     HLD (hyperlipidemia)     Stroke [facility])        Past Surgical History:   Procedure Laterality Date    HX ORTHOPAEDIC         Social History     Social History    Marital status: SINGLE     Spouse name: N/A    Number of children: N/A    Years of education: N/A     Occupational History    Not on file. Social History Main Topics    Smoking status: Never Smoker    Smokeless tobacco: Never Used    Alcohol use No    Drug use: No    Sexual activity: Not on file     Other Topics Concern    Not on file     Social History Narrative       Family History   Problem Relation Age of Onset    Hypertension Other        Allergies   Allergen Reactions    Chocolate [Cocoa] Hives    Citrus And Derivatives Hives    Dairy Aid [Lactase] Hives    Egg Hives    Fish Containing Products Hives    Iodine And Iodide Containing Products Hives    Nuts [Tree Nut] Hives    Shellfish Containing Products Hives    Tomato Hives       Review of Systems:  Positive in bold.   CONST: Fever, weight loss, fatigue or chills  GI: Nausea, vomiting, abdominal pain, change in bowel habits, hematochezia, melena, and GERD   INTEG: Dermatitis, abnormal moles  HEENT: Recent changes in vision, vertigo, epistaxis, dysphagia, hoarseness  CV: Chest pain, palpitations, HTN, edema and varicosities  RESP: Cough, shortness of breath, wheezing, hemoptysis, snoring. : Hematuria, dysuria, frequency, urgency, retention, incontinence   MS: Weakness, joint pain and arthritis  ENDO: Diabetes, thyroid disease, polyuria, polydipsia, polyphagia, poor wound healing, heat intolerance, cold intolerance  LYMPH/HEME: Anemia, bruising and history of blood transfusions  NEURO: Dizziness, headache, fainting, seizures and stroke  PSYCH: Anxiety , depression  SKIN: eczema    Physical Exam:      Visit Vitals    BP (!) 153/93 (BP 1 Location: Right arm, BP Patient Position: At rest)    Pulse 90    Temp 99.3 °F (37.4 °C)    Resp 18    Ht 6' 2\" (1.88 m)    Wt 86.2 kg (190 lb)    SpO2 100%    BMI 24.39 kg/m2       Physical Exam:  Gen:  No distress, alert, awake and oriented x 4  HEENT:  Normal cephalic atraumatic, extra-occular movements are intact. Neck:  Supple, No JVD  Lungs:  Clear bilaterally, no wheeze, no rales, normal effort  Cardiovascular:  Regular Rate and Rhythm, normal S1 and S2, no audible murmur. Capillary refill: < 3 seconds. Abdomen:  Soft, non tender, non distended, normal bowel sounds, no guarding. Extremities:  Well perfused, no cyanosis, + edema left forearm. Skin is taut and red. Neurological:  Awake and alert, CN's are intact except left are contracture secondary to past stroke. Skin:  No rashes or moles. Turgor and color normal  Mental Status:  Normal thought process, does not appear anxious      Laboratory Studies: All lab results for the last 24 hours reviewed.     Assessment/Plan     Active Problems:    Cellulitis of left forearm (3/26/2017)        PLAN:    Infectious: left forearm cellulitis   Continue IV antibiotics- zosyn   Repeat lactic acid levels every 4 hours until normal    Heme:  DVT prophylaxis   Lovenox 40 mg SQ daily     Misc:  FULL CODE   Fall precautions   Ambulate with assistance. Monitor intake and output   Monitor vitals as per unit   Neurovascular checks   Replace electrolytes as needed. Follow up am labs.           Laurel Kemp MD

## 2017-03-26 NOTE — PROGRESS NOTES
[facility]   Discharge Planning/ Assessment    Reasons for Intervention: Chart reviewed, pt states no no longer has a home phone, just mobile [phone]. He lives with 2 friends, Independent with ADL, no HH/DME. Abelardo Farr [phone], will probably drive him home and participate in his dc process. He also has a friend as contact- homa Alatorre [phone]. Plan is home when medically ready.     High Risk Criteria  [] Yes  [x]No   Physician Referral  [] Yes  [x]No        Date    Nursing Referral  [] Yes  [x]No        Date    Patient/Family Request  [] Yes  [x]No        Date       Resources:    Medicare  [] Yes  [x]No   Medicaid  [] Yes  [x]No   No Resources  [] Yes  [x]No   Private Insurance  [x] Yes  []No    Name/Phone Number    Other  [] Yes  [x]No        (i.e. Workman's Comp)         Prior Services:    Prior Services  [] Yes  [x]No   Home Health  [] Yes  [x]No   [address]  [] Yes  [x]No        [address] of [address] Program  [] Yes  [x]No       Meals on Wheels  [] Yes  [x]No   Office on Aging  [] Yes  [x]No   Transportation Services  [] Yes  [x]No   Nursing Home  [] Yes  [x]No        [facility]  [] Yes  [x]No        [address] Name    Other       Information Source:      Information obtained from  [x] Patient  [] Parent   [] [address]  [] Child  [] Spouse   [] Significant Other/Partner   [] Friend      [] EMS    [] Nursing Home Chart          [] Other:   Chart Review  [x] Yes  []No     Family/Support System:    Patient lives with  [] Alone    [] Spouse   [] Significant Other  [] Children  [] Caretaker   [] Parent  [] Sibling     [x] Other       Other Support System:    Is the patient responsible for care of others  [] Yes  [x]No   Information of person caring for patient on  discharge    Managers financial affairs independently  [x] Yes  []No   If no, explain:      Status Prior to Admission:    Mental Status  [] Awake [x] Alert  [] Oriented  [] Quiet/Calm [] Lethargic/Sedated   [] Disoriented  [] Restless/Anxious  [] Combative   Personal Care  [] Dependent  [x] Independent Personal Care  [] Requires Assistance   Meal Preparation Ability  [x] Independent   [] Standby Assistance   [] Minimal Assistance   [] Moderate Assistance  [] Maximum Assistance     [] Total Assistance   Chores  [x] Independent with Chores   [] N/A Nursing Home Resident   [] Requires Assistance   Bowel/Bladder  [x] Continent  [] Catheter  [] Incontinent  [] Ostomy Self-Care    [] Urine Diversion Self-Care  [] Maximum Assistance     [] Total Assistance   Number of Persons needed for assistance    DME at home  [] Alana Wiseman  [] Shan Wiseman   [] Commode    [] Bathroom/Grab Bars  [] Hospital Bed  [] Nebulizer  [] Oxygen           [] Raised Toilet Seat  [] Shower Chair  [] Side Rails for Bed   [] Tub Transfer Bench   [] Autumn Grills  [] Krzysztof Forte, Standard      [] Other:   Vendor      Treatment Presently Receiving:    Current Treatments  [] Chemotherapy  [] Dialysis  [] Insulin  [] IVAB [x] IVF   [] O2  [] PCA   [] PT   [] RT   [] Tube Feedings   [] Wound Care     Psychosocial Evaluation:    Verbalized Knowledge of Disease Process  [] Patient  []Family   Coping with Disease Process  [] Patient  []Family   Requires Further Counseling Coping with Disease Process  [] Patient  []Family     Identified Projected Needs:    Home Health Aid  [] Yes  [x]No   Transportation  [] Yes  [x]No   Education  [] Yes  [x]No        Specific Education     Financial Counseling  [] Yes  [x]No   Inability to Care for Self/Will Require 24 hour care  [] Yes  [x]No   Pain Management  [] Yes  [x]No   Home Infusion Therapy  [] Yes  [x]No   Oxygen Therapy  [] Yes  [x]No   DME  [] Yes  [x]No   Long Term Care Placement  [] Yes  [x]No   Rehab  [] Yes  [x]No   Physical Therapy  [] Yes  [x]No   Needs Anticipated At This Time  [] Yes  [x]No     Intra-Hospital Referral:    [facility]  [] Yes  [x]No     [] Yes  [x]No   Patient Representative  [] Yes  [x]No   Staff for Teaching Needs  [] Yes  [x]No   Specialty Teaching Needs     Diabetic Educator  [] Yes  [x]No   Referral for Diabetic Educator Needed  [] Yes  [x]No  If Yes, place order for Nutritionist or Diabetic Consult     Tentative Discharge Plan:    Home with No Services  [x] Yes  []No   Home with [address]  [] Yes  [x]No        If Yes, specify type    Home Care Program  [] Yes  [x]No        If Yes, specify type    Meals on Wheels  [] Yes  [x]No   Office of Aging  [] Yes  [x]No   NHP  [] Yes  [x]No   Return to the Nursing Home  [] Yes  [x]No   Rehab Therapy  [] Yes  [x]No   Acute Rehab  [] Yes  [x]No   Subacute Rehab  [] Yes  [x]No   Private Care  [] Yes  [x]No   Substance Abuse Referral  [] Yes  [x]No   Transportation  [] Yes  [x]No   Chore Service  [] Yes  [x]No   Inpatient Hospice  [] Yes  [x]No   OP RT  [] Yes  [x] No   OP Hemo  [] Yes  [x] No   OP PT  [] Yes  [x]No   Support Group  [] Yes  [x]No   Reach to Recovery  [] Yes  [x]No   OP Oncology Clinic  [] Yes  [x]No   Clinic Appointment  [] Yes  [x]No   DME  [] Yes  [x]No   Comments    Name of D/C Planner or  Given to Patient or Family Brittanie Griffin Pearle Mail RN   Phone Number         [id number] [phone]   Date 03/26/17   Time    If you are discharged home, whom do you designate to participate in your discharge plan and receive any information needed?      Enter name of dante samuels        Phone # of designee         Address of designee         Updated         Patient refused to designate any           individual

## 2017-03-26 NOTE — PROGRESS NOTES
Early AM admit  Pt s/e at bedside  Admitted for left forearm cellulitis  States able to move more  Area is very inflamed, erthematous and glossy. No sloughing on exam  Will add vancomycin to regimen. Restart home meds, including triamcinolone for his chronic eczema.  Check wound cx  Follow up CBC in AM  T/c ID consult linda Choudhary, DO  Internal Medicine, Hospitalist  Pager: [facility]

## 2017-03-26 NOTE — ED NOTES
Purposeful rounding completed:    Side rails up x 1:  YES  Bed in low position and wheels locked: YES  Call bell within reach: YES  Comfort addressed: YES    Toileting needs addressed: YES  Plan of care reviewed/updated with patient and or family members: YES  IV site assessed: YES  Pain assessed and addressed: YES, 0 pt resting at this time

## 2017-03-27 LAB
ANION GAP BLD CALC-SCNC: 9 MMOL/L (ref 3–18)
BASOPHILS # BLD AUTO: 0 K/UL (ref 0–0.06)
BASOPHILS # BLD: 0 % (ref 0–2)
BUN SERPL-MCNC: 10 MG/DL (ref 7–18)
BUN/CREAT SERPL: 12 (ref 12–20)
CALCIUM SERPL-MCNC: 8.8 MG/DL (ref 8.5–10.1)
CHLORIDE SERPL-SCNC: 108 MMOL/L (ref 100–108)
CO2 SERPL-SCNC: 24 MMOL/L (ref 21–32)
CREAT SERPL-MCNC: 0.85 MG/DL (ref 0.6–1.3)
DIFFERENTIAL METHOD BLD: ABNORMAL
EOSINOPHIL # BLD: 0.3 K/UL (ref 0–0.4)
EOSINOPHIL NFR BLD: 4 % (ref 0–5)
ERYTHROCYTE [DISTWIDTH] IN BLOOD BY AUTOMATED COUNT: 14.5 % (ref 11.6–14.5)
GLUCOSE SERPL-MCNC: 108 MG/DL (ref 74–99)
HCT VFR BLD AUTO: 40.2 % (ref 36–48)
HGB BLD-MCNC: 12.6 G/DL (ref 13–16)
LYMPHOCYTES # BLD AUTO: 15 % (ref 21–52)
LYMPHOCYTES # BLD: 1.2 K/UL (ref 0.9–3.6)
MCH RBC QN AUTO: 28.1 PG (ref 24–34)
MCHC RBC AUTO-ENTMCNC: 31.3 G/DL (ref 31–37)
MCV RBC AUTO: 89.7 FL (ref 74–97)
MONOCYTES # BLD: 0.8 K/UL (ref 0.05–1.2)
MONOCYTES NFR BLD AUTO: 10 % (ref 3–10)
NEUTS SEG # BLD: 5.5 K/UL (ref 1.8–8)
NEUTS SEG NFR BLD AUTO: 71 % (ref 40–73)
PLATELET # BLD AUTO: 254 K/UL (ref 135–420)
PMV BLD AUTO: 11.5 FL (ref 9.2–11.8)
POTASSIUM SERPL-SCNC: 3.9 MMOL/L (ref 3.5–5.5)
RBC # BLD AUTO: 4.48 M/UL (ref 4.7–5.5)
SODIUM SERPL-SCNC: 141 MMOL/L (ref 136–145)
WBC # BLD AUTO: 7.9 K/UL (ref 4.6–13.2)

## 2017-03-27 PROCEDURE — 74011250636 HC RX REV CODE- 250/636: Performed by: NURSE PRACTITIONER

## 2017-03-27 PROCEDURE — 85025 COMPLETE CBC W/AUTO DIFF WBC: CPT | Performed by: HOSPITALIST

## 2017-03-27 PROCEDURE — 74011000258 HC RX REV CODE- 258: Performed by: HOSPITALIST

## 2017-03-27 PROCEDURE — 74011250637 HC RX REV CODE- 250/637: Performed by: HOSPITALIST

## 2017-03-27 PROCEDURE — 65270000029 HC RM PRIVATE

## 2017-03-27 PROCEDURE — 74011250636 HC RX REV CODE- 250/636: Performed by: HOSPITALIST

## 2017-03-27 PROCEDURE — 80048 BASIC METABOLIC PNL TOTAL CA: CPT | Performed by: HOSPITALIST

## 2017-03-27 RX ADMIN — Medication 10 ML: at 15:38

## 2017-03-27 RX ADMIN — ENOXAPARIN SODIUM 40 MG: 40 INJECTION SUBCUTANEOUS at 02:17

## 2017-03-27 RX ADMIN — TRIAMCINOLONE ACETONIDE: 1 OINTMENT TOPICAL at 20:03

## 2017-03-27 RX ADMIN — PIPERACILLIN SODIUM,TAZOBACTAM SODIUM 3.38 G: 3; .375 INJECTION, POWDER, FOR SOLUTION INTRAVENOUS at 05:24

## 2017-03-27 RX ADMIN — Medication 10 ML: at 05:24

## 2017-03-27 RX ADMIN — HYDROCHLOROTHIAZIDE 12.5 MG: 25 TABLET ORAL at 09:48

## 2017-03-27 RX ADMIN — PIPERACILLIN SODIUM,TAZOBACTAM SODIUM 3.38 G: 3; .375 INJECTION, POWDER, FOR SOLUTION INTRAVENOUS at 11:29

## 2017-03-27 RX ADMIN — OXYCODONE HYDROCHLORIDE AND ACETAMINOPHEN 1 TABLET: 5; 325 TABLET ORAL at 04:09

## 2017-03-27 RX ADMIN — TRIAMCINOLONE ACETONIDE: 1 OINTMENT TOPICAL at 09:49

## 2017-03-27 RX ADMIN — Medication 10 ML: at 22:00

## 2017-03-27 NOTE — ROUTINE PROCESS
Bedside and Verbal shift change report given to Rebeca Walker (oncoming nurse) by Susan Velasquez (offgoing nurse). Report included the following information SBAR, Kardex, Intake/Output and MAR.

## 2017-03-27 NOTE — PROGRESS NOTES
If needs iv antibx will raimundo cost out has ins  Coverage but may have copay and may need pre auth. Wait for id consult. Will also need picc.

## 2017-03-27 NOTE — PROGRESS NOTES
2 [facility]  Hospitalist Division        Inpatient Daily Progress Note    Daily progress Note    Patient: Andree Bobby MRN: [id number]  CSN: [id number]    YOB: 1975  Age: 39 y.o. Sex: male    DOA: 3/25/2017 LOS:  LOS: 1 day                    Chief Complaint:  Left arm pain and redness       Subjective:      C/o all over itching     Objective:      Visit Vitals    /85    Pulse 87    Temp 97.8 °F (36.6 °C)    Resp 17    Ht 6' 2\" (1.88 m)    Wt 86.2 kg (190 lb)    SpO2 95%    BMI 24.39 kg/m2         Physical Exam:  General appearance: alert and oriented, cooperative, no distress, appears stated age  Head: Normocephalic, without obvious abnormality, atraumatic  Lungs: clear to auscultation bilaterally  Heart: regular rate and rhythm, S1, S2 normal, no murmur, click, rub or gallop  Abdomen: soft, non tender, non distended.  Normoactive bowel sounds  Extremities: extremities normal, atraumatic, no cyanosis or edema  Skin: dry, scaly, LUE erythema   Neurologic: Right 5/5, LUE 4/5, LLE 5/5   PSY: Mood and affect normal, appropriately behaved        Intake and Output:  Current Shift:  03/27 0701 - 03/27 1900  In: 240 [P.O.:240]  Out: -   Last three shifts:  03/25 1901 - 03/27 0700  In: -   Out: 375 [Urine:375]    Recent Results (from the past 24 hour(s))   CULTURE, WOUND W GRAM STAIN    Collection Time: 03/26/17  2:43 PM   Result Value Ref Range    Special Requests: NO SPECIAL REQUESTS      GRAM STAIN NO ORGANISMS SEEN      GRAM STAIN NO WBC'S SEEN      Culture result: MODERATE  STAPHYLOCOCCUS SPECIES   (A)     METABOLIC PANEL, BASIC    Collection Time: 03/27/17  4:50 AM   Result Value Ref Range    Sodium 141 136 - 145 mmol/L    Potassium 3.9 3.5 - 5.5 mmol/L    Chloride 108 100 - 108 mmol/L    CO2 24 21 - 32 mmol/L    Anion gap 9 3.0 - 18 mmol/L    Glucose 108 (H) 74 - 99 mg/dL    BUN 10 7.0 - 18 MG/DL    Creatinine 0.85 0.6 - 1.3 MG/DL    BUN/Creatinine ratio 12 12 - 20      GFR est AA >60 >60 ml/min/1.73m2    GFR est non-AA >60 >60 ml/min/1.73m2    Calcium 8.8 8.5 - 10.1 MG/DL   CBC WITH AUTOMATED DIFF    Collection Time: 03/27/17  4:50 AM   Result Value Ref Range    WBC 7.9 4.6 - 13.2 K/uL    RBC 4.48 (L) 4.70 - 5.50 M/uL    HGB 12.6 (L) 13.0 - 16.0 g/dL    HCT 40.2 36.0 - 48.0 %    MCV 89.7 74.0 - 97.0 FL    MCH 28.1 24.0 - 34.0 PG    MCHC 31.3 31.0 - 37.0 g/dL    RDW 14.5 11.6 - 14.5 %    PLATELET 660 487 - 434 K/uL    MPV 11.5 9.2 - 11.8 FL    NEUTROPHILS 71 40 - 73 %    LYMPHOCYTES 15 (L) 21 - 52 %    MONOCYTES 10 3 - 10 %    EOSINOPHILS 4 0 - 5 %    BASOPHILS 0 0 - 2 %    ABS. NEUTROPHILS 5.5 1.8 - 8.0 K/UL    ABS. LYMPHOCYTES 1.2 0.9 - 3.6 K/UL    ABS. MONOCYTES 0.8 0.05 - 1.2 K/UL    ABS. EOSINOPHILS 0.3 0.0 - 0.4 K/UL    ABS. BASOPHILS 0.0 0.0 - 0.06 K/UL    DF AUTOMATED             Lab Results   Component Value Date/Time    Glucose 108 03/27/2017 04:50 AM    Glucose 112 03/25/2017 10:09 PM    Glucose 69 09/09/2015 04:05 AM    Glucose 113 09/08/2015 10:07 AM    Glucose 87 09/07/2015 03:58 AM        Assessment/Plan:     Patient Active Problem List   Diagnosis Code    CVA (cerebral infarction) I63.9    Cellulitis L03.90    Cellulitis of hand, right L03. 113    Chronic arterial ischemic stroke I69.30    Lactic acidosis E87.2    Gout M10.9    Chronic eczema L30.9    Cellulitis of left forearm L03.114       A/P:  Cellulitis: pending culture with staph species. Changed to Vanco. Continue to follow for susceptibilities   Hx Gout- no current flare   Hx CVA with left sided weakness  DVT prophylaxis: Lovenox       Robert Byrne, FNP-BEAN [facility]  Pager:  [phone]  Office:  [phone]    I examined the patient , reviewed the history, labs, and radiology reports  independently.  I have reviewed the NP documentation, agree with the documentation, medical decision making and treatment plan as outlined by my NP.    Baldo Burns MD

## 2017-03-27 NOTE — INTERDISCIPLINARY ROUNDS
IDR Summary      Patient: Arnoldo Jalloh MRN: [id number]    Age: 39 y.o.  : 1975     Admit Diagnosis: Cellulitis of left forearm      Problems pertinent to hospital stay:     Consults:Case Management     Testing due for patient today? NO    Nutrition plan:Yes     Mobility needs: No      Lines/Tubes:   IV: YES   Needed: YES  Higgins: NO  Needed:NO  Central Line: NO Needed: NO      VTE Prophylaxis: Chemical    Influenza Vaccine received?  NO- refused         Care Management:  Discharge plan: Home with [facility]   PCP: Magalis Ruelas MD    : NO  Financial concerns:No   Interventions:       LOS: 1 days     Expected days until discharge: 1-2 days        Signed:     CALISTA Avendaño-BC  [address]  Hospitalist Division  Pager:  [phone]  Office:  [phone]

## 2017-03-27 NOTE — PROGRESS NOTES
conducted an initial consultation and Spiritual Assessment for Eli Villa, who is a 39 y.o.,male. Patients Primary Language is: [place]. According to the patients EMR Amish Affiliation is: [facility].     The reason the Patient came to the hospital is:   Patient Active Problem List    Diagnosis Date Noted    Cellulitis of left forearm 03/26/2017    Chronic arterial ischemic stroke 09/07/2015    Lactic acidosis 09/07/2015    Gout 09/07/2015    Chronic eczema 09/07/2015    Cellulitis of hand, right 09/06/2015    Cellulitis 09/04/2015    CVA (cerebral infarction) 12/24/2014        The  provided the following Interventions:  Initiated a relationship of care and support with patient in room 2118 at around 1130 this morning. Still very hopefull tht this will be a very short stay with only a few test  And then out of here. Provided information about Spiritual Care Services. Offered prayer and assurance of continued prayers on patients behalf. The following outcomes were achieved:  Patient shared limited information about his medical narrative and spiritual journey/beliefs. Patient processed feeling about current hospitalization. Patient expressed gratitude for pastoral care visit. Assessment:  Patient does not have any Religion/cultural needs that will affect patients preferences in health care. There are no further spiritual or Religion issues which require Spiritual Care Services interventions at this time. Plan:  Chaplains will continue to follow and will provide pastoral care on an as needed/requested basis    . Haroldine Horse   Spiritual Care   [phone]

## 2017-03-27 NOTE — PROGRESS NOTES
Assumed care of patient. Patient awake in bed, HOB elevated. No c/o pain. Bed in lowest position, call bell in reach. Sbar report received from [facility]       5152 Patient Iv infiltrated, Eri Casarez from ICU came over to try to start an Iv, Three attempts no success. Contacted clyde Supervisor and he came up at [employer]. Patient refused at first and then agreed to received IV. Clyde used the ultra sound and was successful. Vancomycin restarted.

## 2017-03-27 NOTE — PROGRESS NOTES
[facility] Dosing Services     Pharmacy dosing Vancomycin IV empirically for treatment of soft skin tissue infection     Started 1.5gm IV q 8 h     Day of Therapy: 0    Labs:   Bun/Serum Creatinine - 10 mg/dl / 0.85 mg/dl  CrCl - 133 ml/min  WBC - 7.9  Tmax - 98.8    Cultures:    Wound: Staphylococcus Species   Blood: NGTD x 2     Plan: Goal trough 15-20. Load 2 gm IV x 1, Continue 1.5 gm IV q8h. Ordered Vanco-trough on 3/28. Pharmacy to follow and will make changes to dose and/or frequency based on clinical status. Thanks for the consult.

## 2017-03-28 PROCEDURE — 74011250636 HC RX REV CODE- 250/636: Performed by: NURSE PRACTITIONER

## 2017-03-28 PROCEDURE — 74011250637 HC RX REV CODE- 250/637: Performed by: NURSE PRACTITIONER

## 2017-03-28 PROCEDURE — 77030020263 HC SOL INJ SOD CL0.9% LFCR 1000ML

## 2017-03-28 PROCEDURE — 65270000029 HC RM PRIVATE

## 2017-03-28 PROCEDURE — 74011250637 HC RX REV CODE- 250/637: Performed by: HOSPITALIST

## 2017-03-28 PROCEDURE — 74011250636 HC RX REV CODE- 250/636: Performed by: HOSPITALIST

## 2017-03-28 RX ORDER — HYDROXYZINE PAMOATE 25 MG/1
25 CAPSULE ORAL
Status: DISCONTINUED | OUTPATIENT
Start: 2017-03-28 | End: 2017-03-29 | Stop reason: HOSPADM

## 2017-03-28 RX ADMIN — Medication 10 ML: at 22:08

## 2017-03-28 RX ADMIN — SODIUM CHLORIDE 1500 MG: 900 INJECTION, SOLUTION INTRAVENOUS at 04:47

## 2017-03-28 RX ADMIN — SODIUM CHLORIDE 75 ML/HR: 900 INJECTION, SOLUTION INTRAVENOUS at 04:49

## 2017-03-28 RX ADMIN — SODIUM CHLORIDE 1500 MG: 900 INJECTION, SOLUTION INTRAVENOUS at 22:05

## 2017-03-28 RX ADMIN — SODIUM CHLORIDE 1500 MG: 900 INJECTION, SOLUTION INTRAVENOUS at 13:18

## 2017-03-28 RX ADMIN — TRIAMCINOLONE ACETONIDE: 1 OINTMENT TOPICAL at 17:10

## 2017-03-28 RX ADMIN — Medication 10 ML: at 05:07

## 2017-03-28 RX ADMIN — TRIAMCINOLONE ACETONIDE: 1 OINTMENT TOPICAL at 08:38

## 2017-03-28 RX ADMIN — ENOXAPARIN SODIUM 40 MG: 40 INJECTION SUBCUTANEOUS at 02:38

## 2017-03-28 RX ADMIN — HYDROXYZINE PAMOATE 25 MG: 25 CAPSULE ORAL at 23:44

## 2017-03-28 RX ADMIN — HYDROCHLOROTHIAZIDE 12.5 MG: 25 TABLET ORAL at 08:37

## 2017-03-28 RX ADMIN — Medication 10 ML: at 13:18

## 2017-03-28 NOTE — PROGRESS NOTES
Patient received in bed awake. Patient A&Ox4, denies pain and discomfort. No distress noted. Frequently use items within reach. Bed locked in low position. Call bell within reach and Patient verbalized understanding of use for assistance and needs. 0263- Patient with slight infiltrate to RA IV; Vanco stopped; unsuccessful x1 at restart and Patient stated that a vein finder was used. Call Rad nurse spoke with Santana Ivan said will inform PICC nurse.

## 2017-03-28 NOTE — ROUTINE PROCESS
Assumed care of patient at 2000 report received from [facility]. Patient showers, linens changed. Pt awake, alert and oriented X 4. Kenalog lotion applied to body. Pt denies any pain at this time. No verbal distress noted. Bed in lowest position & wheels locked. Call bell within reach. 3/28/17 - 6371 - Bedside and Verbal shift change report given to Lauren Taylor RN (oncoming nurse) by Cheryl Celaya RN BSN (offgoing nurse). Report given with SBAR, Kardex, Intake/Output, MAR and Recent Results.

## 2017-03-28 NOTE — PROGRESS NOTES
Nutrition initial assessment/Plan of care      RECOMMENDATIONS:   1. Regular diet  2. Monitor weight and PO intake  3. RD to follow     GOALS:   1. PO intake meets >75% of protein/calorie needs by 4/4  2. Weight Maintenance (+/- 1-2 lb) by 4/4       ASSESSMENT:   Per BMI of 24.4, weight is in the normal classification. PO intake is adequate. Labs noted. Nutrition recommendations listed. RD to follow. Nutrition Diagnoses:   None at this time    Nutrition Risk:  [] High  [] Moderate [x]  Low    SUBJECTIVE/OBJECTIVE:   Patient with multiple food allergies: Chocolate, Citrus fruits, Dairy products, Egg, Shellfish, Tomato and Nuts. Patient states that he can eat small amount (examples: eggs in the dough of the waffle is fine) but he can't eat the whole food. He denies N/V/D/C. Appetite is good and weight has been stable around 190 lb for > 2 years. Information Obtained from:    [x] Chart Review   [x] Patient   [] Family/Caregiver   [] Nurse/Physician   [] Interdisciplinary Meeting/Rounds    Diet: Regular  Medications: [x] Reviewed (0.9%NaCl: 75 ml/hr)   Allergies: [x] Reviewed   Encounter Diagnoses     ICD-10-CM ICD-9-CM   1. Cellulitis of left elbow L03.114 682.3   2. Cellulitis of left forearm L03.114 682.3   3. Cellulitis of wrist L03.119 682.4   4. Elevated lactic acid level E87.2 276.2   5.  Elevated blood pressure I10 401.9     Past Medical History:   Diagnosis Date    Bacteremia 12/14    Contact dermatitis and other eczema, due to unspecified cause     molluscum and eczema    CVA (cerebral infarction)     Gout     HLD (hyperlipidemia)     [facility]       Labs:  Lab Results   Component Value Date/Time    Sodium 141 03/27/2017 04:50 AM    Potassium 3.9 03/27/2017 04:50 AM    Chloride 108 03/27/2017 04:50 AM    CO2 24 03/27/2017 04:50 AM    Anion gap 9 03/27/2017 04:50 AM    Glucose 108 03/27/2017 04:50 AM    BUN 10 03/27/2017 04:50 AM    Creatinine 0.85 03/27/2017 04:50 AM    Calcium 8.8 03/27/2017 04:50 AM    Magnesium 2.1 12/28/2014 06:10 AM    Phosphorus 2.7 12/25/2014 05:15 AM    Albumin 2.4 09/09/2015 04:05 AM     Anthropometrics: BMI (calculated): 24.4  Last 3 Recorded Weights in this Encounter    03/25/17 2057   Weight: 86.2 kg (190 lb)    Ht Readings from Last 1 Encounters:   03/25/17 6' 2\" (1.88 m)     Patient Vitals for the past 100 hrs:   % Diet Eaten   03/27/17 1130 50 %       IBW: 190 lb %IBW: 100% UBW: 190 lb %UBW: 100%   [] Weight Loss [] Weight Gain [x] Weight Stable    Estimated Nutrition Needs: [x] MSJ  [] Other:  Calories: 4919-0726 kcal Based on:   [x] Actual BW    Protein:    g Based on:   [x] Actual BW    Fluid:       0929-3554 ml Based on:   [x] Actual BW      [x] No Cultural, Cheondoism or ethnic dietary need identified.     [] Cultural, Cheondoism and ethnic food preferences identified and addressed     Wt Status:  [x] Normal (18.6 - 24.9) [] Underweight (<18.5) [] Overweight (25 - 29.9) [] Mild Obesity (30 - 34.9)  [] Moderate Obesity (35 - 39.9) [] Morbid Obesity (40+)   [] Moderate Malnutrition [] Severe Malnutrition in the context of :     Nutrition Problems Identified:   [] Suboptimal PO intake   [] Food Allergies  [] Difficulty chewing/swallowing/poor dentition  [] Constipation/Diarrhea   [] Nausea/Vomiting   [x] None  [] Other:     Plan:   [] Therapeutic Diet  [x]  Obtained/adjusted food preferences/tolerances and/or snacks options   []  Supplements added   [] Occupational therapy following for feeding techniques  []  HS snack added   []  Modify diet texture   [x]  Modify diet for food allergies   []  Educate patient   []  Assist with menu selection   [x]  Monitor PO intake on meal rounds   [x]  Continue inpatient monitoring and intervention   []  Participated in discharge planning/Interdisciplinary rounds/Team meetings   []  Other:     Education Needs:   [] Not appropriate for teaching at this time due to:   [x] Identified and addressed    Nutrition Monitoring and Evaluation:  [x] Continue ongoing monitoring and intervention  [] Other    Army Pandey, JOHN  Pager: [phone]

## 2017-03-28 NOTE — PROGRESS NOTES
2 [facility]  Hospitalist Division        Inpatient Daily Progress Note    Daily progress Note    Patient: Speedy Domingo MRN: [id number]  CSN: [id number]    YOB: 1975  Age: 39 y.o. Sex: male    DOA: 3/25/2017 LOS:  LOS: 2 days                    Chief Complaint:  Left arm pain and redness       Subjective:      Anxious to discharge home    Objective:      Visit Vitals    BP (!) 144/92 (BP 1 Location: Right arm, BP Patient Position: At rest)    Pulse 81    Temp 98.7 °F (37.1 °C)    Resp 18    Ht 6' 2\" (1.88 m)    Wt 86.2 kg (190 lb)    SpO2 99%    BMI 24.39 kg/m2         Physical Exam:  General appearance: alert, oriented, cooperative, no distress, appears stated age  Head: Normocephalic, without obvious abnormality, atraumatic  Lungs: clear to auscultation throughout  Heart: regular rate and rhythm, S1, S2 normal, no murmur, click, rub or gallop  Abdomen: soft, non tender, non distended. Normoactive bowel sounds  Extremities: extremities normal, atraumatic, no cyanosis or edema  Skin: dry, scaly, erythema LUE   Neurologic: Right 5/5, LUE 4/5, LLE 5/5   PSY: Mood and affect normal, appropriately behaved        Intake and Output:  Current Shift:  03/28 0701 - 03/28 1900  In: 120 [P.O.:120]  Out: -   Last three shifts:  03/26 1901 - 03/28 0700  In: 240 [P.O.:240]  Out: 795 [Urine:795]    No results found for this or any previous visit (from the past 24 hour(s)). Lab Results   Component Value Date/Time    Glucose 108 03/27/2017 04:50 AM    Glucose 112 03/25/2017 10:09 PM    Glucose 69 09/09/2015 04:05 AM    Glucose 113 09/08/2015 10:07 AM    Glucose 87 09/07/2015 03:58 AM        Assessment/Plan:     Patient Active Problem List   Diagnosis Code    CVA (cerebral infarction) I63.9    Cellulitis L03.90    Cellulitis of hand, right L03. 113    Chronic arterial ischemic stroke I69.30    Lactic acidosis E87.2    Gout M10.9    Chronic eczema L30.9    Cellulitis of left forearm L03.114       A/P:  Cellulitis: pending culture with staph species. Changed to Vanco. Preliminary culture shows Staph Aureus- waiting for susceptibilities   HTN: HCTZ. Monitor BP control   Eczema- Triamcinolone. Add Hydroxyzine   Hx Gout- no current flare   Hx CVA with left sided weakness  DVT prophylaxis: Lovenox       FRANCIS Lopez 83  Pager:  [phone]  Office:  [phone]    I examined the patient , reviewed the history, labs, and radiology reports  independently. I have reviewed the NP documentation, agree with the documentation, medical decision making and treatment plan as outlined by my NP.     Vinnie Kumar MD

## 2017-03-28 NOTE — ROUTINE PROCESS
Assumed care of pt report received from Susan Schulz RN. Pt awake, alert and oriented X 4. Pt denies any pain at this time. No verbal distress noted. Bed in lowest position & wheels locked. Call bell within reach. 2131 - Patient awake watching tv, No complaints at this time. 2344 - Vistaril po prn given for itching. 3/29/17 - 0330 - Patient sleeping, no distress noted. 8324 - Noted right PIV arm swollen, IV Fluid stopped, right arm elevated. Bedside and Verbal shift change report given to Nataly Rainey RN (oncoming nurse) by Bety Davalos RN BSN  (offgoing nurse). Report given with SBAR, Kardex, Intake/Output, MAR and Recent Results.

## 2017-03-28 NOTE — ROUTINE PROCESS
Bedside / verbal shift change report given to [address] (oncoming nurse) by Dirk Cannon RN (offgoing nurse). Report included the following information SBAR, Kardex, Intake/Output, MAR and Recent Results.

## 2017-03-28 NOTE — ROUTINE PROCESS
Bedside and Verbal shift change report given to Meghan Lowe RN (oncoming nurse) by Estiven Garza RN, BSN (offgoing nurse). Report given with SBAR, Kardex, Intake/Output, MAR and Recent Results.

## 2017-03-28 NOTE — INTERDISCIPLINARY ROUNDS
IDR Summary      Patient: Hernan Mckeon MRN: [id number]    Age: 39 y.o.  : 1975     Admit Diagnosis: Cellulitis of left forearm      Problems pertinent to hospital stay: Eczema     Consults:Case Management     Testing due for patient today? NO    Nutrition plan:Yes     Mobility needs: No      Lines/Tubes:   IV: YES   Needed: YES  Higgins: NO  Needed:NO  Central Line: NO Needed: NO      VTE Prophylaxis: Chemical    Influenza Vaccine received?  NO- refused         Care Management:  Discharge plan: Home with [facility]   PCP: Kuldeep Hays MD    : NO  Financial concerns:No   Interventions:       LOS: 2 days     Expected days until discharge: tomorrow        Signed:     CALISTA [facility]  Hospitalist Division  Pager:  [phone]  Office:  [phone]

## 2017-03-28 NOTE — MED STUDENT NOTES
CC: L forearm pain & swelling    HPI: Mr Blake Gannon is a 38 y/o M who presented to [place] [address] ED on 3/25 for pain, swelling, and redness in his left forearm. He has chronic severe eczema and states he often gets infections from scratching his skin. He underwent a Duplex upper extremity venous ultrasound which was negative and was started on IV Zosyn. He was changed to IV Vancomycin on 3/26 and given Kenalog cream for the itching. Preliminary culture results of the forearm show Staph. Today he is feeling better. He states his pain is better today and the swelling is less. He is able to move his hand better but not yet at his baseline full ROM. He states he is still itching all over his body but the Kenalog cream is helping. PROS:  Constitutional - Negative for fever, chills  Cardiac - negative for chest pain  Respiratory - negative for shortness of breath  Skin - Positive for pruritis over his whole body  Musculoskeletal - positive for pain with movement of LUE at wrist and elbow. Positive for decreased ROM at wrist and elbow. PMH:   Eczema  CVA  Gout  Hyperlipidemia    PSH:   Orthopedic    Meds:  Enoxaparin subQ injection 40mg q24 hours  Hydrochlorothiazide 12.5mg tablet PO daily  Sodium chloride flush 5-10mL IV q8 hours  Tramcinolone acetonide 0.1% ointment Apply to affected skin BID  Vancomycin 1500mg in 0.9% sodium chloride 500 mL IVPB [id number]    Allergies:  Chocolate - hives  Citrus - hives  Dairy - hives  Eggs - hives  Fish - hives  Iodine - hives  Nuts - hives  Shellfish - hives  Tomato - hives    Family History: hypertension - other    Social History: Single, denies smoking, alcohol, or illicit drug use    Physical Exam:  Vitals 0833: /92 P 81 R 18 SpO2 99% on room air  General: Alert and oriented and in no apparent distress.  Sitting up in chair  Heart: RRR with no extra sounds, murmurs, gallops, or rubs  Lungs: CTA bilaterally with no wheezes, crackles, or rhonchi  Extremities: contracture of L hand due to previous CVA  Skin: Dry, scaly, scarred, erythematous. LUE is erythematous and glossy from elbow to wrist. No TTP    Assessment:  DDx arm swelling:  Cellulitis, DVT, Sprain/Strain, lymphedema    Plan:  CBC - cellulitis  Wound Culture - cellulitis  Xray L elbow/hand - r/o musculoskeletal involvement  Duplex venous ultrasound - r/o UE DVT    Continue IV Vancomycin and Lovenox for DVT prophylaxis    *ATTENTION:  This note has been created by a medical student for educational purposes only. Please do not refer to the content of this note for clinical decision-making, billing, or other purposes. Please see attending physicians note to obtain clinical information on this patient. *

## 2017-03-29 VITALS
WEIGHT: 190 LBS | HEIGHT: 74 IN | BODY MASS INDEX: 24.38 KG/M2 | TEMPERATURE: 98 F | DIASTOLIC BLOOD PRESSURE: 72 MMHG | HEART RATE: 94 BPM | OXYGEN SATURATION: 96 % | RESPIRATION RATE: 16 BRPM | SYSTOLIC BLOOD PRESSURE: 140 MMHG

## 2017-03-29 LAB
ANION GAP BLD CALC-SCNC: 9 MMOL/L (ref 3–18)
BACTERIA SPEC CULT: ABNORMAL
BUN SERPL-MCNC: 9 MG/DL (ref 7–18)
BUN/CREAT SERPL: 12 (ref 12–20)
CALCIUM SERPL-MCNC: 8.2 MG/DL (ref 8.5–10.1)
CHLORIDE SERPL-SCNC: 106 MMOL/L (ref 100–108)
CO2 SERPL-SCNC: 24 MMOL/L (ref 21–32)
CREAT SERPL-MCNC: 0.76 MG/DL (ref 0.6–1.3)
GLUCOSE SERPL-MCNC: 123 MG/DL (ref 74–99)
GRAM STN SPEC: ABNORMAL
GRAM STN SPEC: ABNORMAL
POTASSIUM SERPL-SCNC: 3.4 MMOL/L (ref 3.5–5.5)
SERVICE CMNT-IMP: ABNORMAL
SODIUM SERPL-SCNC: 139 MMOL/L (ref 136–145)

## 2017-03-29 PROCEDURE — 74011250637 HC RX REV CODE- 250/637: Performed by: HOSPITALIST

## 2017-03-29 PROCEDURE — 74011250637 HC RX REV CODE- 250/637: Performed by: NURSE PRACTITIONER

## 2017-03-29 PROCEDURE — 74011250636 HC RX REV CODE- 250/636: Performed by: HOSPITALIST

## 2017-03-29 PROCEDURE — 80048 BASIC METABOLIC PNL TOTAL CA: CPT | Performed by: INTERNAL MEDICINE

## 2017-03-29 PROCEDURE — 36415 COLL VENOUS BLD VENIPUNCTURE: CPT | Performed by: INTERNAL MEDICINE

## 2017-03-29 PROCEDURE — 77030020263 HC SOL INJ SOD CL0.9% LFCR 1000ML

## 2017-03-29 PROCEDURE — 74011250636 HC RX REV CODE- 250/636: Performed by: NURSE PRACTITIONER

## 2017-03-29 RX ORDER — HYDROXYZINE PAMOATE 25 MG/1
25 CAPSULE ORAL
Qty: 20 CAP | Refills: 0 | Status: SHIPPED | OUTPATIENT
Start: 2017-03-29 | End: 2017-04-12

## 2017-03-29 RX ORDER — POTASSIUM CHLORIDE 20 MEQ/1
40 TABLET, EXTENDED RELEASE ORAL
Status: COMPLETED | OUTPATIENT
Start: 2017-03-29 | End: 2017-03-29

## 2017-03-29 RX ORDER — SULFAMETHOXAZOLE AND TRIMETHOPRIM 800; 160 MG/1; MG/1
1 TABLET ORAL 2 TIMES DAILY
Qty: 18 TAB | Refills: 0 | Status: SHIPPED | OUTPATIENT
Start: 2017-03-29

## 2017-03-29 RX ADMIN — ENOXAPARIN SODIUM 40 MG: 40 INJECTION SUBCUTANEOUS at 02:56

## 2017-03-29 RX ADMIN — SODIUM CHLORIDE 75 ML/HR: 900 INJECTION, SOLUTION INTRAVENOUS at 06:00

## 2017-03-29 RX ADMIN — SODIUM CHLORIDE 1500 MG: 900 INJECTION, SOLUTION INTRAVENOUS at 06:04

## 2017-03-29 RX ADMIN — HYDROCHLOROTHIAZIDE 12.5 MG: 25 TABLET ORAL at 08:45

## 2017-03-29 RX ADMIN — POTASSIUM CHLORIDE 40 MEQ: 20 TABLET, EXTENDED RELEASE ORAL at 08:45

## 2017-03-29 RX ADMIN — Medication 10 ML: at 06:35

## 2017-03-29 NOTE — PROGRESS NOTES
Assumed care of patient, patient resting  in bed c/o swelling arm, patient arm is weeping. Patient IV infiltrated. Right arm is swollen. Sbar report received from [employer]. Bed in lowest position, call bell within reach. [address] Shaun Borrow to notify him of the situation. 80 Dr Mitch Lopez gave Verbal orders for warm compress, No Iv start.

## 2017-03-29 NOTE — PROGRESS NOTES
Anesthesia called to start PIV on patient. CRNA in room to assess patient. Right arm swollen, weeping and edemaous. Patient unable to bend arm. CRNA spoke with nurse that arm is too compromised to place PIV as the swelling extended from wrist to mid-bicep and patient's MD should be notified and central line should be considered.

## 2017-03-29 NOTE — ROUTINE PROCESS
Patient discharge instructions was reviewed by BENJAMIN FLORES RN. Patient had no c/o pain. Patient stated he understood d/c instructions. Patient was d/c to home. Patient did not have any questions concerning discharge.

## 2017-03-29 NOTE — DISCHARGE INSTRUCTIONS
DISCHARGE SUMMARY from Nurse    The following personal items are in your possession at time of discharge:    Dental Appliances: None  Visual Aid: None, Glasses     Home Medications: None  Jewelry: None  Clothing: Belt, Footwear, Jacket/Coat, Pants, Shirt, Socks, Undergarments  Other Valuables: Cell Phone, [place]             PATIENT INSTRUCTIONS:    After general anesthesia or intravenous sedation, for 24 hours or while taking prescription Narcotics:  · Limit your activities  · Do not drive and operate hazardous machinery  · Do not make important personal or business decisions  · Do  not drink alcoholic beverages  · If you have not urinated within 8 hours after discharge, please contact your surgeon on call. Report the following to your surgeon:  · Excessive pain, swelling, redness or odor of or around the surgical area  · Temperature over 100.5  · Nausea and vomiting lasting longer than 4 hours or if unable to take medications  · Any signs of decreased circulation or nerve impairment to extremity: change in color, persistent  numbness, tingling, coldness or increase pain  · Any questions        What to do at Home:  Recommended activity: as physician advised    If you experience any of the following symptoms listed under Warning Signs of a Heart Attack, Signs and Symptoms of a Stroke, and \"When to Call for Help\" listed under discharge teaching, please follow up with your primary care physician and/or call 911. *  Please give a list of your current medications to your Primary Care Provider. *  Please update this list whenever your medications are discontinued, doses are      changed, or new medications (including over-the-counter products) are added. *  Please carry medication information at all times in case of emergency situations.           These are general instructions for a healthy lifestyle:    No smoking/ No tobacco products/ Avoid exposure to second hand smoke    Surgeon Manfred Bull Warning:  Quitting smoking now greatly reduces serious risk to your health. Obesity, smoking, and sedentary lifestyle greatly increases your risk for illness    A healthy diet, regular physical exercise & weight monitoring are important for maintaining a healthy lifestyle    You may be retaining fluid if you have a history of heart failure or if you experience any of the following symptoms:  Weight gain of 3 pounds or more overnight or 5 pounds in a week, increased swelling in our hands or feet or shortness of breath while lying flat in bed. Please call your doctor as soon as you notice any of these symptoms; do not wait until your next office visit. Recognize signs and symptoms of STROKE:    F-face looks uneven    A-arms unable to move or move unevenly    S-speech slurred or non-existent    T-time-call 911 as soon as signs and symptoms begin-DO NOT go       Back to bed or wait to see if you get better-TIME IS BRAIN. Warning Signs of HEART ATTACK     Call 911 if you have these symptoms:   Chest discomfort. Most heart attacks involve discomfort in the center of the chest that lasts more than a few minutes, or that goes away and comes back. It can feel like uncomfortable pressure, squeezing, fullness, or pain.  Discomfort in other areas of the upper body. Symptoms can include pain or discomfort in one or both arms, the back, neck, jaw, or stomach.  Shortness of breath with or without chest discomfort.  Other signs may include breaking out in a cold sweat, nausea, or lightheadedness. Don't wait more than five minutes to call 911 - MINUTES MATTER! Fast action can save your life. Calling 911 is almost always the fastest way to get lifesaving treatment. Emergency Medical Services staff can begin treatment when they arrive -- up to an hour sooner than if someone gets to the hospital by car. The discharge information has been reviewed with the patient.   The patient verbalized understanding. Discharge medications reviewed with the patient and appropriate educational materials and side effects teaching were provided. Cellulitis: Care Instructions  Your Care Instructions    Cellulitis is a skin infection. It often occurs after a break in the skin from a scrape, cut, bite, or puncture, or after a rash. The doctor has checked you carefully, but problems can develop later. If you notice any problems or new symptoms, get medical treatment right away. Follow-up care is a key part of your treatment and safety. Be sure to make and go to all appointments, and call your doctor if you are having problems. It's also a good idea to know your test results and keep a list of the medicines you take. How can you care for yourself at home? · Take your antibiotics as directed. Do not stop taking them just because you feel better. You need to take the full course of antibiotics. · Prop up the infected area on pillows to reduce pain and swelling. Try to keep the area above the level of your heart as often as you can. · If your doctor told you how to care for your wound, follow your doctor's instructions. If you did not get instructions, follow this general advice:  ¨ Wash the wound with clean water 2 times a day. Don't use hydrogen peroxide or alcohol, which can slow healing. ¨ You may cover the wound with a thin layer of petroleum jelly, such as Vaseline, and a nonstick bandage. ¨ Apply more petroleum jelly and replace the bandage as needed. · Be safe with medicines. Take pain medicines exactly as directed. ¨ If the doctor gave you a prescription medicine for pain, take it as prescribed. ¨ If you are not taking a prescription pain medicine, ask your doctor if you can take an over-the-counter medicine. To prevent cellulitis in the future  · Try to prevent cuts, scrapes, or other injuries to your skin. Cellulitis most often occurs where there is a break in the skin.   · If you get a scrape, cut, mild burn, or bite, wash the wound with clean water as soon as you can to help avoid infection. Don't use hydrogen peroxide or alcohol, which can slow healing. · If you have swelling in your legs (edema), support stockings and good skin care may help prevent leg sores and cellulitis. · Take care of your feet, especially if you have diabetes or other conditions that increase the risk of infection. Wear shoes and socks. Do not go barefoot. If you have athlete's foot or other skin problems on your feet, talk to your doctor about how to treat them. When should you call for help? Call your doctor now or seek immediate medical care if:  · You have signs that your infection is getting worse, such as:  ¨ Increased pain, swelling, warmth, or redness. ¨ Red streaks leading from the area. ¨ Pus draining from the area. ¨ A fever. · You get a rash. Watch closely for changes in your health, and be sure to contact your doctor if:  · You are not getting better after 1 day (24 hours). · You do not get better as expected. Where can you learn more? Go to http://myrnaKeepio.info/. Cruz Acosta in the search box to learn more about \"Cellulitis: Care Instructions. \"  Current as of: October 13, 2016  Content Version: 11.2  © 7090-8088 [employer]. Care instructions adapted under license by [employer] (which disclaims liability or warranty for this information). If you have questions about a medical condition or this instruction, always ask your healthcare professional. [place] [address] any warranty or liability for your use of this information. High Blood Pressure: Care Instructions  Your Care Instructions  If your blood pressure is usually above 140/90, you have high blood pressure, or hypertension. That means the top number is 140 or higher or the bottom number is 90 or higher, or both.   Despite what a lot of people think, high blood pressure usually doesn't cause headaches or make you feel dizzy or lightheaded. It usually has no symptoms. But it does increase your risk for heart attack, stroke, and kidney or eye damage. The higher your blood pressure, the more your risk increases. Your doctor will give you a goal for your blood pressure. Your goal will be based on your health and your age. An example of a goal is to keep your blood pressure below 140/90. Lifestyle changes, such as eating healthy and being active, are always important to help lower blood pressure. You might also take medicine to reach your blood pressure goal.  Follow-up care is a key part of your treatment and safety. Be sure to make and go to all appointments, and call your doctor if you are having problems. It's also a good idea to know your test results and keep a list of the medicines you take. How can you care for yourself at home? Medical treatment  · If you stop taking your medicine, your blood pressure will go back up. You may take one or more types of medicine to lower your blood pressure. Be safe with medicines. Take your medicine exactly as prescribed. Call your doctor if you think you are having a problem with your medicine. · Talk to your doctor before you start taking aspirin every day. Aspirin can help certain people lower their risk of a heart attack or stroke. But taking aspirin isn't right for everyone, because it can cause serious bleeding. · See your doctor regularly. You may need to see the doctor more often at first or until your blood pressure comes down. · If you are taking blood pressure medicine, talk to your doctor before you take decongestants or anti-inflammatory medicine, such as ibuprofen. Some of these medicines can raise blood pressure. · Learn how to check your blood pressure at home. Lifestyle changes  · Stay at a healthy weight.  This is especially important if you put on weight around the waist. Losing even 10 pounds can help you lower your blood pressure. · If your doctor recommends it, get more exercise. Walking is a good choice. Bit by bit, increase the amount you walk every day. Try for at least 30 minutes on most days of the week. You also may want to swim, bike, or do other activities. · Avoid or limit alcohol. Talk to your doctor about whether you can drink any alcohol. · Try to limit how much sodium you eat to less than 2,300 milligrams (mg) a day. Your doctor may ask you to try to eat less than 1,500 mg a day. · Eat plenty of fruits (such as bananas and oranges), vegetables, legumes, whole grains, and low-fat dairy products. · Lower the amount of saturated fat in your diet. Saturated fat is found in animal products such as milk, cheese, and meat. Limiting these foods may help you lose weight and also lower your risk for heart disease. · Do not smoke. Smoking increases your risk for heart attack and stroke. If you need help quitting, talk to your doctor about stop-smoking programs and medicines. These can increase your chances of quitting for good. When should you call for help? Call 911 anytime you think you may need emergency care. This may mean having symptoms that suggest that your blood pressure is causing a serious heart or blood vessel problem. Your blood pressure may be over 180/110. For example, call 911 if:  · You have symptoms of a heart attack. These may include:  ¨ Chest pain or pressure, or a strange feeling in the chest.  ¨ Sweating. ¨ Shortness of breath. ¨ Nausea or vomiting. ¨ Pain, pressure, or a strange feeling in the back, neck, jaw, or upper belly or in one or both shoulders or arms. ¨ Lightheadedness or sudden weakness. ¨ A fast or irregular heartbeat. · You have symptoms of a stroke. These may include:  ¨ Sudden numbness, tingling, weakness, or loss of movement in your face, arm, or leg, especially on only one side of your body. ¨ Sudden vision changes. ¨ Sudden trouble speaking.   ¨ Sudden confusion or trouble understanding simple statements. ¨ Sudden problems with walking or balance. ¨ A sudden, severe headache that is different from past headaches. · You have severe back or belly pain. Do not wait until your blood pressure comes down on its own. Get help right away. Call your doctor now or seek immediate care if:  · Your blood pressure is much higher than normal (such as 180/110 or higher), but you don't have symptoms. · You think high blood pressure is causing symptoms, such as:  ¨ Severe headache. ¨ Blurry vision. Watch closely for changes in your health, and be sure to contact your doctor if:  · Your blood pressure measures 140/90 or higher at least 2 times. That means the top number is 140 or higher or the bottom number is 90 or higher, or both. · You think you may be having side effects from your blood pressure medicine. · Your blood pressure is usually normal, but it goes above normal at least 2 times. Where can you learn more? Go to http://myrnaRealLifeConnectomari.info/. Enter T074 in the search box to learn more about \"High Blood Pressure: Care Instructions. \"  Current as of: August 8, 2016  Content Version: 11.2  © 7267-7742 [employer]. Care instructions adapted under license by [employer] (which disclaims liability or warranty for this information). If you have questions about a medical condition or this instruction, always ask your healthcare professional. [place] [address] any warranty or liability for your use of this information. Low Sodium Diet (2,000 Milligram): Care Instructions  Your Care Instructions  Too much sodium causes your body to hold on to extra water. This can raise your blood pressure and force your heart and kidneys to work harder. In very serious cases, this could cause you to be put in the hospital. It might even be life-threatening. By limiting sodium, you will feel better and lower your risk of serious problems.   The most common source of sodium is salt. People get most of the salt in their diet from canned, prepared, and packaged foods. Fast food and restaurant meals also are very high in sodium. Your doctor will probably limit your sodium to less than 2,000 milligrams (mg) a day. This limit counts all the sodium in prepared and packaged foods and any salt you add to your food. Follow-up care is a key part of your treatment and safety. Be sure to make and go to all appointments, and call your doctor if you are having problems. It's also a good idea to know your test results and keep a list of the medicines you take. How can you care for yourself at home? Read food labels  · Read labels on cans and food packages. The labels tell you how much sodium is in each serving. Make sure that you look at the serving size. If you eat more than the serving size, you have eaten more sodium. · Food labels also tell you the Percent Daily Value for sodium. Choose products with low Percent Daily Values for sodium. · Be aware that sodium can come in forms other than salt, including monosodium glutamate (MSG), sodium citrate, and sodium bicarbonate (baking soda). MSG is often added to Asian food. When you eat out, you can sometimes ask for food without MSG or added salt. Buy low-sodium foods  · Buy foods that are labeled \"unsalted\" (no salt added), \"sodium-free\" (less than 5 mg of sodium per serving), or \"low-sodium\" (less than 140 mg of sodium per serving). Foods labeled \"reduced-sodium\" and \"light sodium\" may still have too much sodium. Be sure to read the label to see how much sodium you are getting. · Buy fresh vegetables, or frozen vegetables without added sauces. Buy low-sodium versions of canned vegetables, soups, and other canned goods. Prepare low-sodium meals  · Cut back on the amount of salt you use in cooking. This will help you adjust to the taste. Do not add salt after cooking.  One teaspoon of salt has about 2,300 mg of sodium. · Take the salt shaker off the table. · Flavor your food with garlic, lemon juice, onion, vinegar, herbs, and spices. Do not use soy sauce, lite soy sauce, steak sauce, onion salt, garlic salt, celery salt, mustard, or ketchup on your food. · Use low-sodium salad dressings, sauces, and ketchup. Or make your own salad dressings and sauces without adding salt. · Use less salt (or none) when recipes call for it. You can often use half the salt a recipe calls for without losing flavor. Other foods such as rice, pasta, and grains do not need added salt. · Rinse canned vegetables, and cook them in fresh water. This removes some--but not all--of the salt. · Avoid water that is naturally high in sodium or that has been treated with water softeners, which add sodium. Call your local water company to find out the sodium content of your water supply. If you buy bottled water, read the label and choose a sodium-free brand. Avoid high-sodium foods  · Avoid eating:  ¨ Smoked, cured, salted, and canned meat, fish, and poultry. ¨ Ham, gomez, hot dogs, and luncheon meats. ¨ Regular, hard, and processed cheese and regular peanut butter. ¨ Crackers with salted tops, and other salted snack foods such as pretzels, chips, and salted popcorn. ¨ Frozen prepared meals, unless labeled low-sodium. ¨ Canned and dried soups, broths, and bouillon, unless labeled sodium-free or low-sodium. ¨ Canned vegetables, unless labeled sodium-free or low-sodium. ¨ Western Sandra fries, pizza, tacos, and other fast foods. ¨ Pickles, olives, ketchup, and other condiments, especially soy sauce, unless labeled sodium-free or low-sodium. Where can you learn more? Go to http://myrna-omari.info/. Enter D034 in the search box to learn more about \"Low Sodium Diet (2,000 Milligram): Care Instructions. \"  Current as of: July 26, 2016  Content Version: 11.2  © 8908-5728 [employer].  Care instructions adapted under license by [address] (which disclaims liability or warranty for this information). If you have questions about a medical condition or this instruction, always ask your healthcare professional. [address] any warranty or liability for your use of this information. Learning About High Blood Pressure  What is high blood pressure? Blood pressure is a measure of how hard the blood pushes against the walls of your arteries. It's normal for blood pressure to go up and down throughout the day, but if it stays up, you have high blood pressure. Another name for high blood pressure is hypertension. Two numbers tell you your blood pressure. The first number is the systolic pressure. It shows how hard the blood pushes when your heart is pumping. The second number is the diastolic pressure. It shows how hard the blood pushes between heartbeats, when your heart is relaxed and filling with blood. A blood pressure of less than 120/80 (say \"120 over 80\") is ideal for an adult. High blood pressure is 140/90 or higher. You have high blood pressure if your top number is 140 or higher or your bottom number is 90 or higher, or both. Many people fall into the category in between, called prehypertension. People with prehypertension need to make lifestyle changes to bring their blood pressure down and help prevent or delay high blood pressure. What happens when you have high blood pressure? · Blood flows through your arteries with too much force. Over time, this damages the walls of your arteries. But you can't feel it. High blood pressure usually doesn't cause symptoms. · Fat and calcium start to build up in your arteries. This buildup is called plaque. Plaque makes your arteries narrower and stiffer. Blood can't flow through them as easily. · This lack of good blood flow starts to damage some of the organs in your body.  This can lead to problems such as coronary artery disease and heart attack, heart failure, stroke, kidney failure, and eye damage. How can you prevent high blood pressure? · Stay at a healthy weight. · Try to limit how much sodium you eat to less than 2,300 milligrams (mg) a day. If you limit your sodium to 1,500 mg a day, you can lower your blood pressure even more. ¨ Buy foods that are labeled \"unsalted,\" \"sodium-free,\" or \"low-sodium. \" Foods labeled \"reduced-sodium\" and \"light sodium\" may still have too much sodium. ¨ Flavor your food with garlic, lemon juice, onion, vinegar, herbs, and spices instead of salt. Do not use soy sauce, steak sauce, onion salt, garlic salt, mustard, or ketchup on your food. ¨ Use less salt (or none) when recipes call for it. You can often use half the salt a recipe calls for without losing flavor. · Be physically active. Get at least 30 minutes of exercise on most days of the week. Walking is a good choice. You also may want to do other activities, such as running, swimming, cycling, or playing tennis or team sports. · Limit alcohol to 2 drinks a day for men and 1 drink a day for women. · Eat plenty of fruits, vegetables, and low-fat dairy products. Eat less saturated and total fats. How is high blood pressure treated? · Your doctor will suggest making lifestyle changes. For example, your doctor may ask you to eat healthy foods, quit smoking, lose extra weight, and be more active. · If lifestyle changes don't help enough or your blood pressure is very high, you will have to take medicine every day. Follow-up care is a key part of your treatment and safety. Be sure to make and go to all appointments, and call your doctor if you are having problems. It's also a good idea to know your test results and keep a list of the medicines you take. Where can you learn more? Go to http://myrna-omari.info/. Enter P501 in the search box to learn more about \"Learning About High Blood Pressure. \"  Current as of: March 23, 2016  Content Version: 11.2  © 7456-5643 [employer]. Care instructions adapted under license by [employer] (which disclaims liability or warranty for this information). If you have questions about a medical condition or this instruction, always ask your healthcare professional. [address] any warranty or liability for your use of this information. "[employer]" Activation    Thank you for requesting access to "[employer]". Please follow the instructions below to securely access and download your online medical record. "[employer]" allows you to send messages to your doctor, view your test results, renew your prescriptions, schedule appointments, and more. How Do I Sign Up? 1. In your internet browser, go to [email or web address]  2. Click on the First Time User? Click Here link in the Sign In box. You will be redirect to the New Member Sign Up page. 3. Enter your "[employer]" Access Code exactly as it appears below. You will not need to use this code after youve completed the sign-up process. If you do not sign up before the expiration date, you must request a new code. "[employer]" Access Code: [email or web address]  Expires: 2017  9:04 PM (This is the date your "[employer] access code will )    4. Enter the last four digits of your Social Security Number (xxxx) and Date of Birth (mm/dd/yyyy) as indicated and click Submit. You will be taken to the next sign-up page. 5. Create a "[employer]" ID. This will be your "[employer]" login ID and cannot be changed, so think of one that is secure and easy to remember. 6. Create a "[employer]" password. You can change your password at any time. 7. Enter your Password Reset Question and Answer. This can be used at a later time if you forget your password. 8. Enter your e-mail address. You will receive e-mail notification when new information is available in [address] 9. Click Sign Up.  You can now view and download portions of your medical record. 10. Click the Download Summary menu link to download a portable copy of your medical information. Additional Information    If you have questions, please visit the Frequently Asked Questions section of the [employer] website at [email or web address] Remember, [employer] is NOT to be used for urgent needs. For medical emergencies, dial 911. Patient armband removed and shredded.

## 2017-03-29 NOTE — DISCHARGE SUMMARY
[facility] [place]    Discharge Summary    Patient: Linda Sykes MRN: [id number]  CSN: [id number]    YOB: 1975  Age: 39 y.o. Sex: male    DOA: 3/25/2017 LOS:  LOS: 3 days   Discharge Date: 3/29/2017     Admission Diagnoses: Cellulitis of left forearm    Discharge Diagnoses:    Problem List as of 3/29/2017  Date Reviewed: 9/28/2012          Codes Class Noted - Resolved    Cellulitis of left forearm ICD-10-CM: L03.114  ICD-9-CM: 682.3  3/26/2017 - Present        Chronic arterial ischemic stroke ICD-10-CM: I69.30  ICD-9-CM: V12.54  9/7/2015 - Present        Lactic acidosis ICD-10-CM: E87.2  ICD-9-CM: 276.2  9/7/2015 - Present        Gout ICD-10-CM: M10.9  ICD-9-CM: 274.9  9/7/2015 - Present        Chronic eczema ICD-10-CM: L30.9  ICD-9-CM: 692.9  9/7/2015 - Present        Cellulitis of hand, right ICD-10-CM: L03.113  ICD-9-CM: 682.4  9/6/2015 - Present        Cellulitis ICD-10-CM: L03.90  ICD-9-CM: 682.9  9/4/2015 - Present        CVA (cerebral infarction) ICD-10-CM: I63.9  ICD-9-CM: 434.91  12/24/2014 - Present              Discharge Condition: Stable    Discharge To: Home    Consults: Hospitalist    Hospital Course: Linda Sykes is a 39 y.o. male who presented to ED complaining of left arm pain and redness. Patient has a history of severe eczema over his entire body. He stated that he scratches often and often times end up with tears in his skin that lead to infections. He noticed the pain earlier in the morning. He denied any other symptoms. While in ED, he was found to have elevated lactic acid. He was placed on IV antibiotics. He was admitted for further management. Prior to the initiation of IV antibiotics the patient's arm was cultured. Preliminary micro results showed staph species and patient was changed to IV Vanco. Final culture grew out staph aureus. Patient's hospital course was further complicated by his PIV infiltrating. Warm compresses were applied and arm elevated.  Patient was instructed to continue with warm compressed and elevation at home. Patient is stable for discharge home with PO antibiotics and instructions to follow up with his PCP in 1 week and Dermatology in 1-2 weeks. Significant Diagnostic Studies:   Recent Results (from the past 24 hour(s))   METABOLIC PANEL, BASIC    Collection Time: 03/29/17  4:07 AM   Result Value Ref Range    Sodium 139 136 - 145 mmol/L    Potassium 3.4 (L) 3.5 - 5.5 mmol/L    Chloride 106 100 - 108 mmol/L    CO2 24 21 - 32 mmol/L    Anion gap 9 3.0 - 18 mmol/L    Glucose 123 (H) 74 - 99 mg/dL    BUN 9 7.0 - 18 MG/DL    Creatinine 0.76 0.6 - 1.3 MG/DL    BUN/Creatinine ratio 12 12 - 20      GFR est AA >60 >60 ml/min/1.73m2    GFR est non-AA >60 >60 ml/min/1.73m2    Calcium 8.2 (L) 8.5 - 10.1 MG/DL         Discharge Medications:    Discharge Medication List as of 3/29/2017 10:58 AM      START taking these medications    Details   hydrOXYzine pamoate (VISTARIL) 25 mg capsule Take 1 Cap by mouth three (3) times daily as needed for Itching for up to 14 days. , Print, Disp-20 Cap, R-0      trimethoprim-sulfamethoxazole (BACTRIM DS) 160-800 mg per tablet Take 1 Tab by mouth two (2) times a day. Indications: Skin and Skin Structure Infection, Print, Disp-18 Tab, R-0         CONTINUE these medications which have NOT CHANGED    Details   famotidine (PEPCID) 20 mg tablet Take 1 Tab by mouth two (2) times a day., Print, Disp-30 Tab, R-0      triamcinolone acetonide (KENALOG) 0.1 % ointment Apply  to affected area two (2) times a day. use thin layer, Historical Med      Hydrochlorothiazide (HYDRODIURIL) 12.5 mg tablet Take 12.5 mg by mouth daily. , Historical Med      atorvastatin (LIPITOR) 40 mg tablet Take 40 mg by mouth daily. , Historical Med      oxyCODONE-acetaminophen (PERCOCET) 5-325 mg per tablet Take 1 tablet by mouth every eight (8) hours as needed. , Print, Disp-10 tablet, R-0      senna-docusate (PERICOLACE) 8.6-50 mg per tablet Take 2 tablets by mouth nightly., No Print, Disp-30 tablet, R-1         STOP taking these medications       triamcinolone acetonide (KENALOG) 0.025 % topical cream Comments:   Reason for Stopping:               Activity: Activity as tolerated    Diet: Resume previous diet    Wound Care: Keep wound clean and dry    Follow-up: PCP in 1 week  Dermatology in 1-2 weeks     Discharge time: 40 minutes   Ginny Valentino NP  3/29/2017, 4:42 PM      I examined the patient , reviewed the history, labs, and radiology reports  independently. I have reviewed the NP documentation, agree with the documentation, medical decision making and treatment plan as outlined by my NP.     Desean Rajput MD

## 2017-03-31 LAB
BACTERIA SPEC CULT: NORMAL
BACTERIA SPEC CULT: NORMAL
SERVICE CMNT-IMP: NORMAL
SERVICE CMNT-IMP: NORMAL

## 2018-12-24 NOTE — MED STUDENT NOTES
CC: left arm pain & swelling    Subjective:  Mr. Federico Pereira is on his 3rd day of admission for L arm cellulitis. Today he is feeling better. He is laying down in bed. He states he is itching less and the Vistaril was helpful. He was able to sleep well last night. He also states his ROM in his left wrist and elbow is better. His R arm IV infiltrated last night and is swollen and uncomfortable. He denies any fever or SOB. Objective:  Vitals 0845: /72 P 94  General: alert and in no apparent distress  Heart: RRR with no extra sounds, murmurs, rubs, or gallops  Abdomen: Normoactive bowel sounds. Soft, nontender to palpation in all quadrants with no masses or organomegaly  Extremities: R arm is swollen and erythematous. L arm is dry, scaly, swollen, erythematous and glossy. Decreased ROM of L elbow and wrist. L hand is contracted due to previous CVA. Deferred strength/ROM assessment of R arm. Assessment:  DDx arm swelling:  Cellulitis, DVT, Sprain/Strain, lymphedema    Plan:  Culture revealed Staph aureus - Continue IV Vancomycin and wait for culture susceptibilities  Continue Triamcinolone cream and hyrdoxyzine PRN for itching  D/C IV and provide warm compress to R arm per Dr. Cindy Ruiz  DVT prophylaxis - Lovenox  *ATTENTION:  This note has been created by a medical student for educational purposes only. Please do not refer to the content of this note for clinical decision-making, billing, or other purposes. Please see attending physicians note to obtain clinical information on this patient. * The patient is a 28y Female complaining of pain, groin.